# Patient Record
Sex: FEMALE | Race: OTHER | HISPANIC OR LATINO | URBAN - METROPOLITAN AREA
[De-identification: names, ages, dates, MRNs, and addresses within clinical notes are randomized per-mention and may not be internally consistent; named-entity substitution may affect disease eponyms.]

---

## 2017-03-07 ENCOUNTER — EMERGENCY (EMERGENCY)
Facility: HOSPITAL | Age: 37
LOS: 0 days | Discharge: HOME | End: 2017-03-07
Attending: EMERGENCY MEDICINE | Admitting: EMERGENCY MEDICINE
Payer: MEDICAID

## 2017-03-07 VITALS
WEIGHT: 147.05 LBS | TEMPERATURE: 98 F | RESPIRATION RATE: 18 BRPM | DIASTOLIC BLOOD PRESSURE: 76 MMHG | SYSTOLIC BLOOD PRESSURE: 122 MMHG | HEIGHT: 62 IN | HEART RATE: 70 BPM | OXYGEN SATURATION: 100 %

## 2017-03-07 DIAGNOSIS — R51 HEADACHE: ICD-10-CM

## 2017-03-07 DIAGNOSIS — R50.9 FEVER, UNSPECIFIED: ICD-10-CM

## 2017-03-07 DIAGNOSIS — H92.02 OTALGIA, LEFT EAR: ICD-10-CM

## 2017-03-07 PROCEDURE — 99284 EMERGENCY DEPT VISIT MOD MDM: CPT

## 2017-03-07 RX ORDER — IBUPROFEN 200 MG
1 TABLET ORAL
Qty: 15 | Refills: 0 | OUTPATIENT
Start: 2017-03-07 | End: 2017-03-12

## 2017-03-07 NOTE — ED STATDOCS - NS ED MD SCRIBE ATTENDING SCRIBE SECTIONS
REVIEW OF SYSTEMS/PAST MEDICAL/SURGICAL/SOCIAL HISTORY/HISTORY OF PRESENT ILLNESS/PROGRESS NOTE/RESULTS/CONSULTATIONS/SHIFT CHANGE/PHYSICAL EXAM/DISPOSITION

## 2017-03-07 NOTE — ED STATDOCS - OBJECTIVE STATEMENT
37 y/o F with no significant PMHx presents to ED c/o left ear pain, generalized HA, fever, myalgias since yesterday. Denies dysuria.  Denies other constitutional complaints at this time.  at bedside. LMP- March 1st. PMD at [facility].

## 2017-03-07 NOTE — ED STATDOCS - MEDICAL DECISION MAKING DETAILS
neuro: CN II - XII intact, EOMI, PERRL, no papilledema, 5/5 muscle strength x 4 extremities, no sensory deficits, 2+ dtr globally, negative babinski, no ataxic gait, normal CARA and FNT, normal romberg   do not suspect meningitis non toxic will tx supporitve viral illness steroid for congestion anti inflamm return to ed for intractable pain new onset motor or senosyr deficits. or any overall worsening pt agrees to plan of care

## 2017-03-07 NOTE — ED STATDOCS - ENMT, MLM
+Nasal congestion.  Mouth with normal mucosa  Throat has no vesicles, no oropharyngeal exudates and uvula is midline.

## 2017-03-25 ENCOUNTER — EMERGENCY (EMERGENCY)
Facility: HOSPITAL | Age: 37
LOS: 0 days | Discharge: HOME | End: 2017-03-25
Attending: EMERGENCY MEDICINE | Admitting: EMERGENCY MEDICINE
Payer: MEDICAID

## 2017-03-25 VITALS
HEART RATE: 67 BPM | RESPIRATION RATE: 20 BRPM | TEMPERATURE: 98 F | DIASTOLIC BLOOD PRESSURE: 73 MMHG | SYSTOLIC BLOOD PRESSURE: 119 MMHG | OXYGEN SATURATION: 100 %

## 2017-03-25 VITALS — WEIGHT: 156.09 LBS | HEIGHT: 59 IN

## 2017-03-25 DIAGNOSIS — R51 HEADACHE: ICD-10-CM

## 2017-03-25 LAB
ALBUMIN SERPL ELPH-MCNC: 4 G/DL — SIGNIFICANT CHANGE UP (ref 3.3–5)
ALP SERPL-CCNC: 78 U/L — SIGNIFICANT CHANGE UP (ref 40–120)
ALT FLD-CCNC: 22 U/L — SIGNIFICANT CHANGE UP (ref 12–78)
ANION GAP SERPL CALC-SCNC: 9 MMOL/L — SIGNIFICANT CHANGE UP (ref 5–17)
AST SERPL-CCNC: 20 U/L — SIGNIFICANT CHANGE UP (ref 15–37)
BASOPHILS # BLD AUTO: 0.1 K/UL — SIGNIFICANT CHANGE UP (ref 0–0.2)
BASOPHILS NFR BLD AUTO: 1.2 % — SIGNIFICANT CHANGE UP (ref 0–2)
BILIRUB SERPL-MCNC: 0.5 MG/DL — SIGNIFICANT CHANGE UP (ref 0.2–1.2)
BUN SERPL-MCNC: 8 MG/DL — SIGNIFICANT CHANGE UP (ref 7–23)
CALCIUM SERPL-MCNC: 8.9 MG/DL — SIGNIFICANT CHANGE UP (ref 8.5–10.1)
CHLORIDE SERPL-SCNC: 106 MMOL/L — SIGNIFICANT CHANGE UP (ref 96–108)
CO2 SERPL-SCNC: 25 MMOL/L — SIGNIFICANT CHANGE UP (ref 22–31)
CREAT SERPL-MCNC: 0.59 MG/DL — SIGNIFICANT CHANGE UP (ref 0.5–1.3)
EOSINOPHIL # BLD AUTO: 0.2 K/UL — SIGNIFICANT CHANGE UP (ref 0–0.5)
EOSINOPHIL NFR BLD AUTO: 2.2 % — SIGNIFICANT CHANGE UP (ref 0–6)
GLUCOSE SERPL-MCNC: 92 MG/DL — SIGNIFICANT CHANGE UP (ref 70–99)
HCT VFR BLD CALC: 41.9 % — SIGNIFICANT CHANGE UP (ref 34.5–45)
HGB BLD-MCNC: 14.3 G/DL — SIGNIFICANT CHANGE UP (ref 11.5–15.5)
LYMPHOCYTES # BLD AUTO: 2 K/UL — SIGNIFICANT CHANGE UP (ref 1–3.3)
LYMPHOCYTES # BLD AUTO: 26.2 % — SIGNIFICANT CHANGE UP (ref 13–44)
MCHC RBC-ENTMCNC: 30.4 PG — SIGNIFICANT CHANGE UP (ref 27–34)
MCHC RBC-ENTMCNC: 34.2 GM/DL — SIGNIFICANT CHANGE UP (ref 32–36)
MCV RBC AUTO: 88.8 FL — SIGNIFICANT CHANGE UP (ref 80–100)
MONOCYTES # BLD AUTO: 0.3 K/UL — SIGNIFICANT CHANGE UP (ref 0–0.9)
MONOCYTES NFR BLD AUTO: 3.9 % — SIGNIFICANT CHANGE UP (ref 2–14)
NEUTROPHILS # BLD AUTO: 5.1 K/UL — SIGNIFICANT CHANGE UP (ref 1.8–7.4)
NEUTROPHILS NFR BLD AUTO: 66.5 % — SIGNIFICANT CHANGE UP (ref 43–77)
PLATELET # BLD AUTO: 228 K/UL — SIGNIFICANT CHANGE UP (ref 150–400)
POTASSIUM SERPL-MCNC: 4.3 MMOL/L — SIGNIFICANT CHANGE UP (ref 3.5–5.3)
POTASSIUM SERPL-SCNC: 4.3 MMOL/L — SIGNIFICANT CHANGE UP (ref 3.5–5.3)
PROT SERPL-MCNC: 7.5 GM/DL — SIGNIFICANT CHANGE UP (ref 6–8.3)
RBC # BLD: 4.72 M/UL — SIGNIFICANT CHANGE UP (ref 3.8–5.2)
RBC # FLD: 11.5 % — SIGNIFICANT CHANGE UP (ref 10.3–14.5)
SODIUM SERPL-SCNC: 140 MMOL/L — SIGNIFICANT CHANGE UP (ref 135–145)
WBC # BLD: 7.6 K/UL — SIGNIFICANT CHANGE UP (ref 3.8–10.5)
WBC # FLD AUTO: 7.6 K/UL — SIGNIFICANT CHANGE UP (ref 3.8–10.5)

## 2017-03-25 PROCEDURE — 70450 CT HEAD/BRAIN W/O DYE: CPT | Mod: 26

## 2017-03-25 PROCEDURE — 99285 EMERGENCY DEPT VISIT HI MDM: CPT

## 2017-03-25 RX ORDER — KETOROLAC TROMETHAMINE 30 MG/ML
30 SYRINGE (ML) INJECTION ONCE
Qty: 0 | Refills: 0 | Status: DISCONTINUED | OUTPATIENT
Start: 2017-03-25 | End: 2017-03-25

## 2017-03-25 RX ORDER — SODIUM CHLORIDE 9 MG/ML
1000 INJECTION INTRAMUSCULAR; INTRAVENOUS; SUBCUTANEOUS ONCE
Qty: 0 | Refills: 0 | Status: COMPLETED | OUTPATIENT
Start: 2017-03-25 | End: 2017-03-25

## 2017-03-25 RX ORDER — METOCLOPRAMIDE HCL 10 MG
10 TABLET ORAL ONCE
Qty: 0 | Refills: 0 | Status: COMPLETED | OUTPATIENT
Start: 2017-03-25 | End: 2017-03-25

## 2017-03-25 RX ORDER — ACETAMINOPHEN 500 MG
1000 TABLET ORAL ONCE
Qty: 0 | Refills: 0 | Status: COMPLETED | OUTPATIENT
Start: 2017-03-25 | End: 2017-03-25

## 2017-03-25 RX ADMIN — Medication 10 MILLIGRAM(S): at 12:12

## 2017-03-25 RX ADMIN — Medication 30 MILLIGRAM(S): at 13:15

## 2017-03-25 RX ADMIN — SODIUM CHLORIDE 1000 MILLILITER(S): 9 INJECTION INTRAMUSCULAR; INTRAVENOUS; SUBCUTANEOUS at 12:09

## 2017-03-25 RX ADMIN — Medication 400 MILLIGRAM(S): at 12:10

## 2017-03-25 NOTE — ED STATDOCS - OBJECTIVE STATEMENT
35 y/o F presents to the ED c/o headache and nausea. Pt seen in  ED 2 weeks ago for similar symptoms. She went to [facility] and given medication without significant relief. She notes 3 days ago she had nausea and dizziness. She took tylenol today without significant relief. Currently pt has no other complaints and denies vomiting, diarrhea, and fever.

## 2017-03-25 NOTE — ED STATDOCS - PROGRESS NOTE DETAILS
36 yr. old female presents to ED with headache 36 yr. old female presents to ED with headache for 1 month + chills no fever + nausea no vomiting no visual disturbance. Reports she was seen in ED 2 weeks ago for the same. She states she F/U at [facility] and given Rx. without relief. PE; HEENT PERRLA EOMS intact, Neck; = tenderness left tonsilar node, throat eythematous no exudate, Chest; Lungs clear. Abd; soft non tender Plan: IV , saline and 1 Liter of fluids Reglan and Acetomenophed IV .Will F/U with results and re evaluate. Lucia PATTON 36 yr. old female presents to ED with headache for 1 month + chills no fever + nausea no vomiting no visual disturbance. Reports she was seen in ED 2 weeks ago for the same. She states she F/U at [facility] and given Rx. without relief. PE; HEENT PERRLA EOMS intact, Neck; = tenderness left tonsilar node, throat eythematous no exudate, Chest; Lungs clear. Abd; soft non tender Plan: IV , saline and 1 Liter of fluids Reglan and Acetomenophed IV .Will F/U with results and re evaluate. Lucia PATTON   # [id number]

## 2017-03-25 NOTE — ED STATDOCS - NS ED MD SCRIBE ATTENDING SCRIBE SECTIONS
PAST MEDICAL/SURGICAL/SOCIAL HISTORY/RESULTS/DISPOSITION/PROGRESS NOTE/HISTORY OF PRESENT ILLNESS/PHYSICAL EXAM/REVIEW OF SYSTEMS

## 2017-03-25 NOTE — ED STATDOCS - ATTENDING CONTRIBUTION TO CARE
I, Hector Fernandez, performed the initial face to face bedside interview with this patient regarding history of present illness, review of symptoms and relevant past medical, social and family history.  I completed an independent physical examination.  I was the initial provider who evaluated this patient. I have signed out the follow up of any pending tests (i.e. labs, radiological studies) to the ACP.  I have communicated the patient’s plan of care and disposition with the ACP.  The history, relevant review of systems, past medical and surgical history, medical decision making, and physical examination was documented by the scribe in my presence and I attest to the accuracy of the documentation.

## 2017-03-25 NOTE — ED ADULT NURSE NOTE - ED STAT RN HANDOFF DETAILS
Received care of patient from Super Track RN, Lorena Coelho. Patient resting comfortably will continue to monitor. Awaiting test results. Patient in no acute distress at this time.

## 2017-10-12 ENCOUNTER — EMERGENCY (EMERGENCY)
Facility: HOSPITAL | Age: 37
LOS: 0 days | Discharge: HOME | End: 2017-10-12
Attending: EMERGENCY MEDICINE | Admitting: EMERGENCY MEDICINE
Payer: MEDICAID

## 2017-10-12 VITALS — WEIGHT: 130.07 LBS | HEIGHT: 60 IN

## 2017-10-12 VITALS
HEART RATE: 68 BPM | OXYGEN SATURATION: 100 % | SYSTOLIC BLOOD PRESSURE: 113 MMHG | DIASTOLIC BLOOD PRESSURE: 75 MMHG | RESPIRATION RATE: 15 BRPM | TEMPERATURE: 98 F

## 2017-10-12 DIAGNOSIS — M54.6 PAIN IN THORACIC SPINE: ICD-10-CM

## 2017-10-12 DIAGNOSIS — R10.9 UNSPECIFIED ABDOMINAL PAIN: ICD-10-CM

## 2017-10-12 DIAGNOSIS — E03.9 HYPOTHYROIDISM, UNSPECIFIED: ICD-10-CM

## 2017-10-12 DIAGNOSIS — R31.9 HEMATURIA, UNSPECIFIED: ICD-10-CM

## 2017-10-12 LAB
ALBUMIN SERPL ELPH-MCNC: 4 G/DL — SIGNIFICANT CHANGE UP (ref 3.3–5)
ALP SERPL-CCNC: 72 U/L — SIGNIFICANT CHANGE UP (ref 40–120)
ALT FLD-CCNC: 20 U/L — SIGNIFICANT CHANGE UP (ref 12–78)
ANION GAP SERPL CALC-SCNC: 6 MMOL/L — SIGNIFICANT CHANGE UP (ref 5–17)
APPEARANCE UR: CLEAR — SIGNIFICANT CHANGE UP
AST SERPL-CCNC: 12 U/L — LOW (ref 15–37)
BACTERIA # UR AUTO: (no result)
BASOPHILS # BLD AUTO: 0.1 K/UL — SIGNIFICANT CHANGE UP (ref 0–0.2)
BASOPHILS NFR BLD AUTO: 1 % — SIGNIFICANT CHANGE UP (ref 0–2)
BILIRUB SERPL-MCNC: 0.5 MG/DL — SIGNIFICANT CHANGE UP (ref 0.2–1.2)
BILIRUB UR-MCNC: NEGATIVE — SIGNIFICANT CHANGE UP
BUN SERPL-MCNC: 11 MG/DL — SIGNIFICANT CHANGE UP (ref 7–23)
CALCIUM SERPL-MCNC: 8.6 MG/DL — SIGNIFICANT CHANGE UP (ref 8.5–10.1)
CHLORIDE SERPL-SCNC: 107 MMOL/L — SIGNIFICANT CHANGE UP (ref 96–108)
CO2 SERPL-SCNC: 26 MMOL/L — SIGNIFICANT CHANGE UP (ref 22–31)
COLOR SPEC: YELLOW — SIGNIFICANT CHANGE UP
COMMENT - URINE: SIGNIFICANT CHANGE UP
CREAT SERPL-MCNC: 0.67 MG/DL — SIGNIFICANT CHANGE UP (ref 0.5–1.3)
DIFF PNL FLD: (no result)
EOSINOPHIL # BLD AUTO: 0.2 K/UL — SIGNIFICANT CHANGE UP (ref 0–0.5)
EOSINOPHIL NFR BLD AUTO: 2.8 % — SIGNIFICANT CHANGE UP (ref 0–6)
EPI CELLS # UR: SIGNIFICANT CHANGE UP
GLUCOSE SERPL-MCNC: 79 MG/DL — SIGNIFICANT CHANGE UP (ref 70–99)
GLUCOSE UR QL: NEGATIVE MG/DL — SIGNIFICANT CHANGE UP
HCT VFR BLD CALC: 41.2 % — SIGNIFICANT CHANGE UP (ref 34.5–45)
HGB BLD-MCNC: 14 G/DL — SIGNIFICANT CHANGE UP (ref 11.5–15.5)
KETONES UR-MCNC: NEGATIVE — SIGNIFICANT CHANGE UP
LEUKOCYTE ESTERASE UR-ACNC: (no result)
LIDOCAIN IGE QN: 103 U/L — SIGNIFICANT CHANGE UP (ref 73–393)
LYMPHOCYTES # BLD AUTO: 2.5 K/UL — SIGNIFICANT CHANGE UP (ref 1–3.3)
LYMPHOCYTES # BLD AUTO: 35.1 % — SIGNIFICANT CHANGE UP (ref 13–44)
MCHC RBC-ENTMCNC: 30.7 PG — SIGNIFICANT CHANGE UP (ref 27–34)
MCHC RBC-ENTMCNC: 34 GM/DL — SIGNIFICANT CHANGE UP (ref 32–36)
MCV RBC AUTO: 90.4 FL — SIGNIFICANT CHANGE UP (ref 80–100)
MONOCYTES # BLD AUTO: 0.3 K/UL — SIGNIFICANT CHANGE UP (ref 0–0.9)
MONOCYTES NFR BLD AUTO: 4.1 % — SIGNIFICANT CHANGE UP (ref 2–14)
NEUTROPHILS # BLD AUTO: 4 K/UL — SIGNIFICANT CHANGE UP (ref 1.8–7.4)
NEUTROPHILS NFR BLD AUTO: 57.1 % — SIGNIFICANT CHANGE UP (ref 43–77)
NITRITE UR-MCNC: NEGATIVE — SIGNIFICANT CHANGE UP
PH UR: 5 — SIGNIFICANT CHANGE UP (ref 5–8)
PLATELET # BLD AUTO: 231 K/UL — SIGNIFICANT CHANGE UP (ref 150–400)
POTASSIUM SERPL-MCNC: 4.1 MMOL/L — SIGNIFICANT CHANGE UP (ref 3.5–5.3)
POTASSIUM SERPL-SCNC: 4.1 MMOL/L — SIGNIFICANT CHANGE UP (ref 3.5–5.3)
PROT SERPL-MCNC: 7.1 GM/DL — SIGNIFICANT CHANGE UP (ref 6–8.3)
PROT UR-MCNC: 15 MG/DL
RBC # BLD: 4.55 M/UL — SIGNIFICANT CHANGE UP (ref 3.8–5.2)
RBC # FLD: 11 % — SIGNIFICANT CHANGE UP (ref 10.3–14.5)
RBC CASTS # UR COMP ASSIST: SIGNIFICANT CHANGE UP /HPF (ref 0–4)
SODIUM SERPL-SCNC: 139 MMOL/L — SIGNIFICANT CHANGE UP (ref 135–145)
SP GR SPEC: 1.02 — SIGNIFICANT CHANGE UP (ref 1.01–1.02)
UROBILINOGEN FLD QL: NEGATIVE MG/DL — SIGNIFICANT CHANGE UP
WBC # BLD: 7 K/UL — SIGNIFICANT CHANGE UP (ref 3.8–10.5)
WBC # FLD AUTO: 7 K/UL — SIGNIFICANT CHANGE UP (ref 3.8–10.5)
WBC UR QL: SIGNIFICANT CHANGE UP

## 2017-10-12 PROCEDURE — 99284 EMERGENCY DEPT VISIT MOD MDM: CPT

## 2017-10-12 PROCEDURE — 71020: CPT | Mod: 26

## 2017-10-12 PROCEDURE — 93010 ELECTROCARDIOGRAM REPORT: CPT

## 2017-10-12 RX ORDER — LIDOCAINE 4 G/100G
1 CREAM TOPICAL ONCE
Qty: 0 | Refills: 0 | Status: COMPLETED | OUTPATIENT
Start: 2017-10-12 | End: 2017-10-12

## 2017-10-12 RX ORDER — OXYCODONE AND ACETAMINOPHEN 5; 325 MG/1; MG/1
1 TABLET ORAL ONCE
Qty: 0 | Refills: 0 | Status: DISCONTINUED | OUTPATIENT
Start: 2017-10-12 | End: 2017-10-12

## 2017-10-12 RX ADMIN — LIDOCAINE 1 PATCH: 4 CREAM TOPICAL at 11:38

## 2017-10-12 RX ADMIN — OXYCODONE AND ACETAMINOPHEN 1 TABLET(S): 5; 325 TABLET ORAL at 11:38

## 2017-10-12 NOTE — ED PROVIDER NOTE - MEDICAL DECISION MAKING DETAILS
Pt w/ thoracic back pain.  Area is point tender directly over the muscle suggesting MSK cause but given location will get screening labs and UA.

## 2017-10-12 NOTE — ED PROVIDER NOTE - MUSCULOSKELETAL, MLM
(+) right-sided thoracic back tenderness, reproducible with palpation.  Spine appears normal, range of motion is not limited, no joint tenderness

## 2017-10-12 NOTE — ED PROVIDER NOTE - PROGRESS NOTE DETAILS
UA some blood, not suggesting of UTI reymundo w/ lac of urinary symptoms. No acute severe colicky pain as seen w/ stones.  Motrin PRN, recommend f/u and further eval at [facility]/PMD.

## 2017-10-12 NOTE — ED ADULT NURSE NOTE - CHPI ED SYMPTOMS NEG
no anorexia/no bladder dysfunction/no numbness/no bowel dysfunction/no motor function loss/no tingling/no difficulty bearing weight

## 2017-10-12 NOTE — ED PROVIDER NOTE - NS_ ATTENDINGSCRIBEDETAILS _ED_A_ED_FT
I, Hector Serrano, performed the initial face to face bedside interview with this patient regarding history of present illness, review of symptoms and relevant past medical, social and family history.  I completed an independent physical examination.  I was the initial provider who evaluated this patient.  The history, relevant review of systems, past medical and surgical history, medical decision making, and physical examination was documented by the scribe in my presence and I attest to the accuracy of the documentation.

## 2017-10-12 NOTE — ED PROVIDER NOTE - OBJECTIVE STATEMENT
36 y/o female with PMHx of cholecystectomy, hypothyroidism presents to the ED c/o pain in center of her back for 1 week, worse today.  Pain is worse with movement. (+) SOB due to pain.  Denies cough, denies bowel or bladder dysfunction.  Took ibuprofen without relief.  Nonsmoker.

## 2017-12-27 ENCOUNTER — EMERGENCY (EMERGENCY)
Facility: HOSPITAL | Age: 37
LOS: 0 days | Discharge: HOME | End: 2017-12-28
Attending: EMERGENCY MEDICINE | Admitting: EMERGENCY MEDICINE
Payer: MEDICAID

## 2017-12-27 VITALS — WEIGHT: 130.07 LBS

## 2017-12-27 DIAGNOSIS — R10.32 LEFT LOWER QUADRANT PAIN: ICD-10-CM

## 2017-12-27 LAB
ALBUMIN SERPL ELPH-MCNC: 4 G/DL — SIGNIFICANT CHANGE UP (ref 3.3–5)
ALP SERPL-CCNC: 89 U/L — SIGNIFICANT CHANGE UP (ref 40–120)
ALT FLD-CCNC: 28 U/L — SIGNIFICANT CHANGE UP (ref 12–78)
ANION GAP SERPL CALC-SCNC: 5 MMOL/L — SIGNIFICANT CHANGE UP (ref 5–17)
APPEARANCE UR: CLEAR — SIGNIFICANT CHANGE UP
AST SERPL-CCNC: 23 U/L — SIGNIFICANT CHANGE UP (ref 15–37)
BACTERIA # UR AUTO: NEGATIVE — SIGNIFICANT CHANGE UP
BASOPHILS # BLD AUTO: 0.1 K/UL — SIGNIFICANT CHANGE UP (ref 0–0.2)
BASOPHILS NFR BLD AUTO: 1.5 % — SIGNIFICANT CHANGE UP (ref 0–2)
BILIRUB SERPL-MCNC: 0.2 MG/DL — SIGNIFICANT CHANGE UP (ref 0.2–1.2)
BILIRUB UR-MCNC: NEGATIVE — SIGNIFICANT CHANGE UP
BUN SERPL-MCNC: 14 MG/DL — SIGNIFICANT CHANGE UP (ref 7–23)
CALCIUM SERPL-MCNC: 8.8 MG/DL — SIGNIFICANT CHANGE UP (ref 8.5–10.1)
CHLORIDE SERPL-SCNC: 105 MMOL/L — SIGNIFICANT CHANGE UP (ref 96–108)
CO2 SERPL-SCNC: 26 MMOL/L — SIGNIFICANT CHANGE UP (ref 22–31)
COLOR SPEC: YELLOW — SIGNIFICANT CHANGE UP
CREAT SERPL-MCNC: 0.63 MG/DL — SIGNIFICANT CHANGE UP (ref 0.5–1.3)
DIFF PNL FLD: (no result)
EOSINOPHIL # BLD AUTO: 0.1 K/UL — SIGNIFICANT CHANGE UP (ref 0–0.5)
EOSINOPHIL NFR BLD AUTO: 2 % — SIGNIFICANT CHANGE UP (ref 0–6)
EPI CELLS # UR: SIGNIFICANT CHANGE UP
GLUCOSE SERPL-MCNC: 87 MG/DL — SIGNIFICANT CHANGE UP (ref 70–99)
GLUCOSE UR QL: NEGATIVE MG/DL — SIGNIFICANT CHANGE UP
HCG SERPL-ACNC: <1 MIU/ML — SIGNIFICANT CHANGE UP
HCT VFR BLD CALC: 42.1 % — SIGNIFICANT CHANGE UP (ref 34.5–45)
HGB BLD-MCNC: 14.2 G/DL — SIGNIFICANT CHANGE UP (ref 11.5–15.5)
KETONES UR-MCNC: NEGATIVE — SIGNIFICANT CHANGE UP
LEUKOCYTE ESTERASE UR-ACNC: NEGATIVE — SIGNIFICANT CHANGE UP
LYMPHOCYTES # BLD AUTO: 2.9 K/UL — SIGNIFICANT CHANGE UP (ref 1–3.3)
LYMPHOCYTES # BLD AUTO: 37.9 % — SIGNIFICANT CHANGE UP (ref 13–44)
MCHC RBC-ENTMCNC: 30.2 PG — SIGNIFICANT CHANGE UP (ref 27–34)
MCHC RBC-ENTMCNC: 33.6 GM/DL — SIGNIFICANT CHANGE UP (ref 32–36)
MCV RBC AUTO: 90 FL — SIGNIFICANT CHANGE UP (ref 80–100)
MONOCYTES # BLD AUTO: 0.5 K/UL — SIGNIFICANT CHANGE UP (ref 0–0.9)
MONOCYTES NFR BLD AUTO: 6.2 % — SIGNIFICANT CHANGE UP (ref 2–14)
NEUTROPHILS # BLD AUTO: 4 K/UL — SIGNIFICANT CHANGE UP (ref 1.8–7.4)
NEUTROPHILS NFR BLD AUTO: 52.5 % — SIGNIFICANT CHANGE UP (ref 43–77)
NITRITE UR-MCNC: NEGATIVE — SIGNIFICANT CHANGE UP
PH UR: 6.5 — SIGNIFICANT CHANGE UP (ref 5–8)
PLATELET # BLD AUTO: 239 K/UL — SIGNIFICANT CHANGE UP (ref 150–400)
POTASSIUM SERPL-MCNC: 4.1 MMOL/L — SIGNIFICANT CHANGE UP (ref 3.5–5.3)
POTASSIUM SERPL-SCNC: 4.1 MMOL/L — SIGNIFICANT CHANGE UP (ref 3.5–5.3)
PROT SERPL-MCNC: 7.6 GM/DL — SIGNIFICANT CHANGE UP (ref 6–8.3)
PROT UR-MCNC: NEGATIVE MG/DL — SIGNIFICANT CHANGE UP
RBC # BLD: 4.68 M/UL — SIGNIFICANT CHANGE UP (ref 3.8–5.2)
RBC # FLD: 11.7 % — SIGNIFICANT CHANGE UP (ref 10.3–14.5)
RBC CASTS # UR COMP ASSIST: (no result) /HPF (ref 0–4)
SODIUM SERPL-SCNC: 136 MMOL/L — SIGNIFICANT CHANGE UP (ref 135–145)
SP GR SPEC: 1 — LOW (ref 1.01–1.02)
UROBILINOGEN FLD QL: NEGATIVE MG/DL — SIGNIFICANT CHANGE UP
WBC # BLD: 7.6 K/UL — SIGNIFICANT CHANGE UP (ref 3.8–10.5)
WBC # FLD AUTO: 7.6 K/UL — SIGNIFICANT CHANGE UP (ref 3.8–10.5)
WBC UR QL: SIGNIFICANT CHANGE UP

## 2017-12-27 PROCEDURE — 74177 CT ABD & PELVIS W/CONTRAST: CPT | Mod: 26

## 2017-12-27 PROCEDURE — 99284 EMERGENCY DEPT VISIT MOD MDM: CPT

## 2017-12-27 RX ORDER — SODIUM CHLORIDE 9 MG/ML
3 INJECTION INTRAMUSCULAR; INTRAVENOUS; SUBCUTANEOUS ONCE
Qty: 0 | Refills: 0 | Status: COMPLETED | OUTPATIENT
Start: 2017-12-27 | End: 2017-12-27

## 2017-12-27 RX ORDER — SODIUM CHLORIDE 9 MG/ML
1000 INJECTION INTRAMUSCULAR; INTRAVENOUS; SUBCUTANEOUS ONCE
Qty: 0 | Refills: 0 | Status: COMPLETED | OUTPATIENT
Start: 2017-12-27 | End: 2017-12-27

## 2017-12-27 RX ADMIN — SODIUM CHLORIDE 3 MILLILITER(S): 9 INJECTION INTRAMUSCULAR; INTRAVENOUS; SUBCUTANEOUS at 21:46

## 2017-12-27 RX ADMIN — SODIUM CHLORIDE 1000 MILLILITER(S): 9 INJECTION INTRAMUSCULAR; INTRAVENOUS; SUBCUTANEOUS at 21:46

## 2017-12-27 NOTE — ED STATDOCS - OBJECTIVE STATEMENT
36 y/o female with PMHx of hypothyroidism presents to the ED c/o LLQ abdominal pain for 2 days, worse in LLQ.  No fever, no diarrhea, no vomiting.  no other acute medical complaints.  NKDA.  PCP Dr Santiago at [facility]

## 2017-12-27 NOTE — ED STATDOCS - ATTENDING CONTRIBUTION TO CARE
Attending Contribution to Care: I, Nataliya Robertson, performed the initial face to face bedside interview with this patient regarding history of present illness, review of symptoms and relevant past medical, social and family history.  I completed an independent physical examination.  I was the initial provider who evaluated this patient and the history, physical, and MDM reflect this intial assessment. I have signed out the follow up of any pending tests after the original (i.e. labs, radiological studies) to the ACP with instructions to review any with instructions to review any concerning findings to me prior to discharge.  I have communicated the patient’s plan of care and disposition with the ACP.

## 2017-12-27 NOTE — ED ADULT NURSE NOTE - OBJECTIVE STATEMENT
Pt c/o LLQ abdominal pain that started Saturday that radiates down leg and into knee, described as throbbing and burning 10/10. Has hx cholecystectomy x4 years ago and "some of my veins removed last year and I was told I'm not allowed to make much effort because it is dangerous so I don't lift anything heavy or work". Pt also reports pain sometimes in back, but does not recall any injury or lifting anything heavy. +nausea, denies diarrhea or vomiting, reports having problems with BMs but last BM 12/26/2017 in evening. Denies urinary F/U/D. Denies fevers/chills. Pt able to tolerate PO well.

## 2017-12-28 VITALS
RESPIRATION RATE: 16 BRPM | OXYGEN SATURATION: 100 % | TEMPERATURE: 98 F | SYSTOLIC BLOOD PRESSURE: 120 MMHG | DIASTOLIC BLOOD PRESSURE: 76 MMHG | HEART RATE: 69 BPM

## 2017-12-28 RX ORDER — KETOROLAC TROMETHAMINE 30 MG/ML
30 SYRINGE (ML) INJECTION ONCE
Qty: 0 | Refills: 0 | Status: DISCONTINUED | OUTPATIENT
Start: 2017-12-28 | End: 2017-12-28

## 2017-12-28 RX ADMIN — Medication 30 MILLIGRAM(S): at 01:19

## 2018-03-15 ENCOUNTER — APPOINTMENT (OUTPATIENT)
Dept: CT IMAGING | Facility: CLINIC | Age: 38
End: 2018-03-15

## 2018-03-20 ENCOUNTER — OUTPATIENT (OUTPATIENT)
Dept: OUTPATIENT SERVICES | Facility: HOSPITAL | Age: 38
LOS: 1 days | End: 2018-03-20
Payer: COMMERCIAL

## 2018-03-20 ENCOUNTER — APPOINTMENT (OUTPATIENT)
Dept: MRI IMAGING | Facility: CLINIC | Age: 38
End: 2018-03-20
Payer: COMMERCIAL

## 2018-03-20 DIAGNOSIS — Z00.8 ENCOUNTER FOR OTHER GENERAL EXAMINATION: ICD-10-CM

## 2018-03-20 PROCEDURE — 70551 MRI BRAIN STEM W/O DYE: CPT | Mod: 26

## 2018-03-20 PROCEDURE — 70551 MRI BRAIN STEM W/O DYE: CPT

## 2018-08-24 ENCOUNTER — RESULT REVIEW (OUTPATIENT)
Age: 38
End: 2018-08-24

## 2018-08-24 PROBLEM — E03.9 HYPOTHYROIDISM, UNSPECIFIED: Chronic | Status: ACTIVE | Noted: 2017-10-20

## 2018-08-27 ENCOUNTER — APPOINTMENT (OUTPATIENT)
Dept: MAMMOGRAPHY | Facility: CLINIC | Age: 38
End: 2018-08-27
Payer: COMMERCIAL

## 2018-08-27 ENCOUNTER — OUTPATIENT (OUTPATIENT)
Dept: OUTPATIENT SERVICES | Facility: HOSPITAL | Age: 38
LOS: 1 days | End: 2018-08-27
Payer: COMMERCIAL

## 2018-08-27 DIAGNOSIS — Z00.8 ENCOUNTER FOR OTHER GENERAL EXAMINATION: ICD-10-CM

## 2018-08-27 PROCEDURE — 77063 BREAST TOMOSYNTHESIS BI: CPT

## 2018-08-27 PROCEDURE — 77067 SCR MAMMO BI INCL CAD: CPT | Mod: 26

## 2018-08-27 PROCEDURE — 77067 SCR MAMMO BI INCL CAD: CPT

## 2018-08-27 PROCEDURE — 77063 BREAST TOMOSYNTHESIS BI: CPT | Mod: 26

## 2018-09-16 ENCOUNTER — EMERGENCY (EMERGENCY)
Facility: HOSPITAL | Age: 38
LOS: 0 days | Discharge: HOME | End: 2018-09-16
Attending: STUDENT IN AN ORGANIZED HEALTH CARE EDUCATION/TRAINING PROGRAM | Admitting: STUDENT IN AN ORGANIZED HEALTH CARE EDUCATION/TRAINING PROGRAM
Payer: MEDICAID

## 2018-09-16 VITALS — HEART RATE: 65 BPM | DIASTOLIC BLOOD PRESSURE: 78 MMHG | TEMPERATURE: 97 F | SYSTOLIC BLOOD PRESSURE: 113 MMHG

## 2018-09-16 VITALS — HEIGHT: 60 IN | WEIGHT: 149.91 LBS

## 2018-09-16 DIAGNOSIS — E03.9 HYPOTHYROIDISM, UNSPECIFIED: ICD-10-CM

## 2018-09-16 DIAGNOSIS — R11.10 VOMITING, UNSPECIFIED: ICD-10-CM

## 2018-09-16 DIAGNOSIS — R42 DIZZINESS AND GIDDINESS: ICD-10-CM

## 2018-09-16 DIAGNOSIS — Z90.49 ACQUIRED ABSENCE OF OTHER SPECIFIED PARTS OF DIGESTIVE TRACT: ICD-10-CM

## 2018-09-16 LAB
ALBUMIN SERPL ELPH-MCNC: 4 G/DL — SIGNIFICANT CHANGE UP (ref 3.3–5)
ALP SERPL-CCNC: 75 U/L — SIGNIFICANT CHANGE UP (ref 40–120)
ALT FLD-CCNC: 19 U/L — SIGNIFICANT CHANGE UP (ref 12–78)
ANION GAP SERPL CALC-SCNC: 6 MMOL/L — SIGNIFICANT CHANGE UP (ref 5–17)
APPEARANCE UR: CLEAR — SIGNIFICANT CHANGE UP
AST SERPL-CCNC: 11 U/L — LOW (ref 15–37)
BACTERIA # UR AUTO: ABNORMAL
BASOPHILS # BLD AUTO: 0.06 K/UL — SIGNIFICANT CHANGE UP (ref 0–0.2)
BASOPHILS NFR BLD AUTO: 0.7 % — SIGNIFICANT CHANGE UP (ref 0–2)
BILIRUB SERPL-MCNC: 0.5 MG/DL — SIGNIFICANT CHANGE UP (ref 0.2–1.2)
BILIRUB UR-MCNC: NEGATIVE — SIGNIFICANT CHANGE UP
BUN SERPL-MCNC: 15 MG/DL — SIGNIFICANT CHANGE UP (ref 7–23)
CALCIUM SERPL-MCNC: 8.4 MG/DL — LOW (ref 8.5–10.1)
CHLORIDE SERPL-SCNC: 107 MMOL/L — SIGNIFICANT CHANGE UP (ref 96–108)
CO2 SERPL-SCNC: 27 MMOL/L — SIGNIFICANT CHANGE UP (ref 22–31)
COLOR SPEC: YELLOW — SIGNIFICANT CHANGE UP
COMMENT - URINE: SIGNIFICANT CHANGE UP
CREAT SERPL-MCNC: 0.66 MG/DL — SIGNIFICANT CHANGE UP (ref 0.5–1.3)
DIFF PNL FLD: ABNORMAL
EOSINOPHIL # BLD AUTO: 0.18 K/UL — SIGNIFICANT CHANGE UP (ref 0–0.5)
EOSINOPHIL NFR BLD AUTO: 2.1 % — SIGNIFICANT CHANGE UP (ref 0–6)
EPI CELLS # UR: SIGNIFICANT CHANGE UP
GLUCOSE SERPL-MCNC: 97 MG/DL — SIGNIFICANT CHANGE UP (ref 70–99)
GLUCOSE UR QL: NEGATIVE MG/DL — SIGNIFICANT CHANGE UP
HCT VFR BLD CALC: 39.2 % — SIGNIFICANT CHANGE UP (ref 34.5–45)
HGB BLD-MCNC: 13.3 G/DL — SIGNIFICANT CHANGE UP (ref 11.5–15.5)
IMM GRANULOCYTES NFR BLD AUTO: 0.4 % — SIGNIFICANT CHANGE UP (ref 0–1.5)
KETONES UR-MCNC: NEGATIVE — SIGNIFICANT CHANGE UP
LEUKOCYTE ESTERASE UR-ACNC: NEGATIVE — SIGNIFICANT CHANGE UP
LIDOCAIN IGE QN: 63 U/L — LOW (ref 73–393)
LYMPHOCYTES # BLD AUTO: 2.05 K/UL — SIGNIFICANT CHANGE UP (ref 1–3.3)
LYMPHOCYTES # BLD AUTO: 24.1 % — SIGNIFICANT CHANGE UP (ref 13–44)
MCHC RBC-ENTMCNC: 30.4 PG — SIGNIFICANT CHANGE UP (ref 27–34)
MCHC RBC-ENTMCNC: 33.9 GM/DL — SIGNIFICANT CHANGE UP (ref 32–36)
MCV RBC AUTO: 89.5 FL — SIGNIFICANT CHANGE UP (ref 80–100)
MONOCYTES # BLD AUTO: 0.37 K/UL — SIGNIFICANT CHANGE UP (ref 0–0.9)
MONOCYTES NFR BLD AUTO: 4.4 % — SIGNIFICANT CHANGE UP (ref 2–14)
NEUTROPHILS # BLD AUTO: 5.8 K/UL — SIGNIFICANT CHANGE UP (ref 1.8–7.4)
NEUTROPHILS NFR BLD AUTO: 68.3 % — SIGNIFICANT CHANGE UP (ref 43–77)
NITRITE UR-MCNC: NEGATIVE — SIGNIFICANT CHANGE UP
NRBC # BLD: 0 /100 WBCS — SIGNIFICANT CHANGE UP (ref 0–0)
PH UR: 5 — SIGNIFICANT CHANGE UP (ref 5–8)
PLATELET # BLD AUTO: 238 K/UL — SIGNIFICANT CHANGE UP (ref 150–400)
POTASSIUM SERPL-MCNC: 3.8 MMOL/L — SIGNIFICANT CHANGE UP (ref 3.5–5.3)
POTASSIUM SERPL-SCNC: 3.8 MMOL/L — SIGNIFICANT CHANGE UP (ref 3.5–5.3)
PROT SERPL-MCNC: 7.3 GM/DL — SIGNIFICANT CHANGE UP (ref 6–8.3)
PROT UR-MCNC: 15 MG/DL
RBC # BLD: 4.38 M/UL — SIGNIFICANT CHANGE UP (ref 3.8–5.2)
RBC # FLD: 12.1 % — SIGNIFICANT CHANGE UP (ref 10.3–14.5)
RBC CASTS # UR COMP ASSIST: ABNORMAL /HPF (ref 0–4)
SODIUM SERPL-SCNC: 140 MMOL/L — SIGNIFICANT CHANGE UP (ref 135–145)
SP GR SPEC: 1.02 — SIGNIFICANT CHANGE UP (ref 1.01–1.02)
UROBILINOGEN FLD QL: NEGATIVE MG/DL — SIGNIFICANT CHANGE UP
WBC # BLD: 8.49 K/UL — SIGNIFICANT CHANGE UP (ref 3.8–10.5)
WBC # FLD AUTO: 8.49 K/UL — SIGNIFICANT CHANGE UP (ref 3.8–10.5)
WBC UR QL: NEGATIVE — SIGNIFICANT CHANGE UP

## 2018-09-16 PROCEDURE — 93010 ELECTROCARDIOGRAM REPORT: CPT

## 2018-09-16 PROCEDURE — 99285 EMERGENCY DEPT VISIT HI MDM: CPT

## 2018-09-16 RX ORDER — MECLIZINE HCL 12.5 MG
12.5 TABLET ORAL ONCE
Qty: 0 | Refills: 0 | Status: DISCONTINUED | OUTPATIENT
Start: 2018-09-16 | End: 2018-09-16

## 2018-09-16 RX ORDER — FAMOTIDINE 10 MG/ML
20 INJECTION INTRAVENOUS ONCE
Qty: 0 | Refills: 0 | Status: COMPLETED | OUTPATIENT
Start: 2018-09-16 | End: 2018-09-16

## 2018-09-16 RX ORDER — MECLIZINE HCL 12.5 MG
1 TABLET ORAL
Qty: 15 | Refills: 0 | OUTPATIENT
Start: 2018-09-16 | End: 2018-09-20

## 2018-09-16 RX ORDER — ONDANSETRON 8 MG/1
4 TABLET, FILM COATED ORAL ONCE
Qty: 0 | Refills: 0 | Status: COMPLETED | OUTPATIENT
Start: 2018-09-16 | End: 2018-09-16

## 2018-09-16 RX ORDER — SODIUM CHLORIDE 9 MG/ML
1000 INJECTION INTRAMUSCULAR; INTRAVENOUS; SUBCUTANEOUS ONCE
Qty: 0 | Refills: 0 | Status: COMPLETED | OUTPATIENT
Start: 2018-09-16 | End: 2018-09-16

## 2018-09-16 RX ADMIN — SODIUM CHLORIDE 1000 MILLILITER(S): 9 INJECTION INTRAMUSCULAR; INTRAVENOUS; SUBCUTANEOUS at 11:42

## 2018-09-16 RX ADMIN — SODIUM CHLORIDE 1000 MILLILITER(S): 9 INJECTION INTRAMUSCULAR; INTRAVENOUS; SUBCUTANEOUS at 12:42

## 2018-09-16 RX ADMIN — FAMOTIDINE 20 MILLIGRAM(S): 10 INJECTION INTRAVENOUS at 11:42

## 2018-09-16 RX ADMIN — ONDANSETRON 4 MILLIGRAM(S): 8 TABLET, FILM COATED ORAL at 12:35

## 2018-09-16 NOTE — ED STATDOCS - ATTENDING CONTRIBUTION TO CARE
I, Kalee Barajas DO,  performed the initial face to face bedside interview with this patient regarding history of present illness, review of symptoms and relevant past medical, social and family history.  I completed an independent physical examination.  I was the initial provider who evaluated this patient. I have signed out the follow up of any pending tests (i.e. labs, radiological studies) to the ACP.  I have communicated the patient’s plan of care and disposition with the ACP.  The history, relevant review of systems, past medical and surgical history, medical decision making, and physical examination was documented by the scribe in my presence and I attest to the accuracy of the documentation.

## 2018-09-16 NOTE — ED STATDOCS - OBJECTIVE STATEMENT
39 y/o female with a PMHx of hypothyroidism, cholecystectomy presents to the ED c/o vomiting and diffuse abd pain, starting this morning around 5am. +bilateral lower back pain. Pt reports the abd pain began first and then later had 3 episodes of emesis, notes it to be a yellow color. Denies hematemesis. Pt states she experiences lightheadedness and dizziness associated with abd pain and vomiting, says the "room is spinning". Denies chest pain, urinary complaints, diarrhea. Pt has experienced dizziness in the past 2 years ago and came to the ED, diagnosed with migraines. LMP 8 days ago. No recent travel. No new medications or new foods. No ETOH, tobacco, or drug use. FHMx of mother with DM. No pain meds PTA.  #[id number].

## 2018-09-16 NOTE — ED STATDOCS - PROGRESS NOTE DETAILS
ESTRELLA Yuan:   Patient has been seen, evaluated and orders have been written by the attending in intake. Patient is stable.  I will follow up the results of orders written and I will continue to evaluate/observe the patient.  Margarette Yuan PA-C Pt. feeling better will continue to monitor.  Margarette Yuan PA-C Pt. states pain and dizziness improved.  Via interpretor #[id number] reviewed labs and discussed need for follow up.  Will send patient home with meclizine.  Pt. understands need for follow up.  Discussed return precautions.  Margarette Yuan PA-C

## 2018-09-16 NOTE — ED STATDOCS - MEDICAL DECISION MAKING DETAILS
39 y/o female with vomiting, HA and abd pain, benign exam, plan for labs. 39 y/o female with vomiting, HA and abd pain, benign exam, plan for labs, urine; re-eval

## 2018-09-17 LAB
CULTURE RESULTS: SIGNIFICANT CHANGE UP
SPECIMEN SOURCE: SIGNIFICANT CHANGE UP

## 2018-10-03 ENCOUNTER — RESULT REVIEW (OUTPATIENT)
Age: 38
End: 2018-10-03

## 2018-10-03 ENCOUNTER — OUTPATIENT (OUTPATIENT)
Dept: OUTPATIENT SERVICES | Facility: HOSPITAL | Age: 38
LOS: 1 days | Discharge: HOME | End: 2018-10-03
Payer: MEDICAID

## 2018-10-03 VITALS
DIASTOLIC BLOOD PRESSURE: 71 MMHG | SYSTOLIC BLOOD PRESSURE: 104 MMHG | WEIGHT: 139.99 LBS | TEMPERATURE: 98 F | HEIGHT: 59 IN | HEART RATE: 73 BPM | RESPIRATION RATE: 17 BRPM | OXYGEN SATURATION: 100 %

## 2018-10-03 LAB — HCG UR QL: NEGATIVE — SIGNIFICANT CHANGE UP

## 2018-10-03 PROCEDURE — 88305 TISSUE EXAM BY PATHOLOGIST: CPT | Mod: 26

## 2018-10-03 PROCEDURE — 88312 SPECIAL STAINS GROUP 1: CPT | Mod: 26

## 2018-10-03 PROCEDURE — 88313 SPECIAL STAINS GROUP 2: CPT | Mod: 26

## 2018-10-03 RX ORDER — LEVOTHYROXINE SODIUM 125 MCG
0 TABLET ORAL
Qty: 0 | Refills: 0 | COMMUNITY

## 2018-10-05 LAB — SURGICAL PATHOLOGY FINAL REPORT - CH: SIGNIFICANT CHANGE UP

## 2018-10-08 DIAGNOSIS — E03.9 HYPOTHYROIDISM, UNSPECIFIED: ICD-10-CM

## 2018-10-08 DIAGNOSIS — K21.9 GASTRO-ESOPHAGEAL REFLUX DISEASE WITHOUT ESOPHAGITIS: ICD-10-CM

## 2018-10-08 DIAGNOSIS — K29.50 UNSPECIFIED CHRONIC GASTRITIS WITHOUT BLEEDING: ICD-10-CM

## 2018-10-08 DIAGNOSIS — B96.81 HELICOBACTER PYLORI [H. PYLORI] AS THE CAUSE OF DISEASES CLASSIFIED ELSEWHERE: ICD-10-CM

## 2018-11-29 ENCOUNTER — APPOINTMENT (OUTPATIENT)
Dept: CT IMAGING | Facility: CLINIC | Age: 38
End: 2018-11-29

## 2018-11-29 ENCOUNTER — OUTPATIENT (OUTPATIENT)
Dept: OUTPATIENT SERVICES | Facility: HOSPITAL | Age: 38
LOS: 1 days | End: 2018-11-29
Payer: COMMERCIAL

## 2018-11-29 DIAGNOSIS — Z00.8 ENCOUNTER FOR OTHER GENERAL EXAMINATION: ICD-10-CM

## 2018-11-29 PROCEDURE — 74177 CT ABD & PELVIS W/CONTRAST: CPT | Mod: 26

## 2018-11-29 PROCEDURE — 74177 CT ABD & PELVIS W/CONTRAST: CPT

## 2019-03-23 ENCOUNTER — EMERGENCY (EMERGENCY)
Facility: HOSPITAL | Age: 39
LOS: 0 days | Discharge: HOME | End: 2019-03-23
Attending: EMERGENCY MEDICINE | Admitting: EMERGENCY MEDICINE
Payer: MEDICAID

## 2019-03-23 VITALS — HEIGHT: 62 IN | WEIGHT: 149.91 LBS

## 2019-03-23 VITALS
HEART RATE: 92 BPM | SYSTOLIC BLOOD PRESSURE: 119 MMHG | RESPIRATION RATE: 19 BRPM | OXYGEN SATURATION: 100 % | DIASTOLIC BLOOD PRESSURE: 82 MMHG | TEMPERATURE: 99 F

## 2019-03-23 DIAGNOSIS — L04.9 ACUTE LYMPHADENITIS, UNSPECIFIED: ICD-10-CM

## 2019-03-23 DIAGNOSIS — J02.9 ACUTE PHARYNGITIS, UNSPECIFIED: ICD-10-CM

## 2019-03-23 PROCEDURE — 99283 EMERGENCY DEPT VISIT LOW MDM: CPT

## 2019-03-23 RX ORDER — DEXAMETHASONE 0.5 MG/5ML
8 ELIXIR ORAL ONCE
Qty: 0 | Refills: 0 | Status: COMPLETED | OUTPATIENT
Start: 2019-03-23 | End: 2019-03-23

## 2019-03-23 RX ORDER — IBUPROFEN 200 MG
1 TABLET ORAL
Qty: 28 | Refills: 0 | OUTPATIENT
Start: 2019-03-23 | End: 2019-03-29

## 2019-03-23 RX ORDER — IBUPROFEN 200 MG
600 TABLET ORAL ONCE
Qty: 0 | Refills: 0 | Status: COMPLETED | OUTPATIENT
Start: 2019-03-23 | End: 2019-03-23

## 2019-03-23 RX ADMIN — Medication 1 TABLET(S): at 09:18

## 2019-03-23 RX ADMIN — Medication 8 MILLIGRAM(S): at 09:18

## 2019-03-23 RX ADMIN — Medication 600 MILLIGRAM(S): at 09:18

## 2019-03-23 NOTE — ED PROVIDER NOTE - CLINICAL SUMMARY MEDICAL DECISION MAKING FREE TEXT BOX
pt with left sdied swollen painful lymph node will give steroids for swelling, motrin for pain and antibiotics f/u ent

## 2019-03-23 NOTE — ED ADULT NURSE NOTE - OBJECTIVE STATEMENT
Pt states her left neck, left ear and throat are hurting. Left neck noted to be swollen and tender to the touch

## 2019-03-23 NOTE — ED PROVIDER NOTE - CARE PROVIDER_API CALL
Zafar Donaldson)  Otolaryngology  [address] [place] [address]  Phone: [phone]  Fax: [phone]  Follow Up Time:

## 2019-03-23 NOTE — ED ADULT NURSE NOTE - NSIMPLEMENTINTERV_GEN_ALL_ED
Implemented All Universal Safety Interventions:  [place] to call system. Call bell, personal items and telephone within reach. Instruct patient to call for assistance. Room bathroom lighting operational. Non-slip footwear when patient is off stretcher. Physically safe environment: no spills, clutter or unnecessary equipment. Stretcher in lowest position, wheels locked, appropriate side rails in place.

## 2019-03-23 NOTE — ED PROVIDER NOTE - OBJECTIVE STATEMENT
37 yo female pw left ear pain and throat pain that started thursday. pt has ho hypothyroidism. no fever or chills, pt can tolerate her own secreations

## 2019-05-06 ENCOUNTER — APPOINTMENT (OUTPATIENT)
Dept: OTOLARYNGOLOGY | Facility: CLINIC | Age: 39
End: 2019-05-06
Payer: COMMERCIAL

## 2019-05-06 ENCOUNTER — OUTPATIENT (OUTPATIENT)
Dept: OUTPATIENT SERVICES | Facility: HOSPITAL | Age: 39
LOS: 1 days | Discharge: HOME | End: 2019-05-06

## 2019-05-06 VITALS
WEIGHT: 156 LBS | DIASTOLIC BLOOD PRESSURE: 82 MMHG | HEIGHT: 62 IN | HEART RATE: 76 BPM | SYSTOLIC BLOOD PRESSURE: 128 MMHG | BODY MASS INDEX: 28.71 KG/M2

## 2019-05-06 DIAGNOSIS — H91.90 UNSPECIFIED HEARING LOSS, UNSPECIFIED EAR: ICD-10-CM

## 2019-05-06 DIAGNOSIS — H61.21 IMPACTED CERUMEN, RIGHT EAR: ICD-10-CM

## 2019-05-06 DIAGNOSIS — Z80.0 FAMILY HISTORY OF MALIGNANT NEOPLASM OF DIGESTIVE ORGANS: ICD-10-CM

## 2019-05-06 DIAGNOSIS — Z86.79 PERSONAL HISTORY OF OTHER DISEASES OF THE CIRCULATORY SYSTEM: ICD-10-CM

## 2019-05-06 DIAGNOSIS — Z83.3 FAMILY HISTORY OF DIABETES MELLITUS: ICD-10-CM

## 2019-05-06 DIAGNOSIS — H93.8X3 OTHER SPECIFIED DISORDERS OF EAR, BILATERAL: ICD-10-CM

## 2019-05-06 DIAGNOSIS — E03.9 HYPOTHYROIDISM, UNSPECIFIED: ICD-10-CM

## 2019-05-06 DIAGNOSIS — H66.90 OTITIS MEDIA, UNSPECIFIED, UNSPECIFIED EAR: ICD-10-CM

## 2019-05-06 PROCEDURE — 99203 OFFICE O/P NEW LOW 30 MIN: CPT | Mod: 25

## 2019-05-06 PROCEDURE — 69210 REMOVE IMPACTED EAR WAX UNI: CPT

## 2019-05-06 RX ORDER — LEVOTHYROXINE SODIUM 137 UG/1
TABLET ORAL
Refills: 0 | Status: ACTIVE | COMMUNITY

## 2019-05-06 NOTE — PHYSICAL EXAM
[Normal] : cranial nerves 2-12 intact [de-identified] : + TMJ Pain bilaterally on mild palpation [de-identified] : R Cerumen impaction, removed on exam, TMs wnl after cerumen removed

## 2019-05-06 NOTE — REASON FOR VISIT
[Family Member] : family member [Patient Declined  Services] : - None: Patient declined  services [FreeTextEntry2] : Eda León

## 2019-05-06 NOTE — HISTORY OF PRESENT ILLNESS
[de-identified] : 39 yo F with BL otalgia and nasal congestion x 7 months. Denies otorrhea, significant vertigo. Denies weight loss, espitaxis, night sweats. Ear pain is worse when chewing and eating. She reports mild reduction in hearing on R side--reports this is improved after cerumen removal today on exam. Pt reportedly had an episode of epistaxis 6 months ago, but did not have any further episodes.

## 2019-05-13 DIAGNOSIS — H93.8X3 OTHER SPECIFIED DISORDERS OF EAR, BILATERAL: ICD-10-CM

## 2019-05-13 DIAGNOSIS — H61.21 IMPACTED CERUMEN, RIGHT EAR: ICD-10-CM

## 2019-05-13 DIAGNOSIS — M26.609 UNSPECIFIED TEMPOROMANDIBULAR JOINT DISORDER, UNSPECIFIED SIDE: ICD-10-CM

## 2019-07-15 ENCOUNTER — APPOINTMENT (OUTPATIENT)
Dept: OTOLARYNGOLOGY | Facility: CLINIC | Age: 39
End: 2019-07-15
Payer: COMMERCIAL

## 2019-07-15 VITALS
BODY MASS INDEX: 28.71 KG/M2 | WEIGHT: 156 LBS | HEART RATE: 72 BPM | DIASTOLIC BLOOD PRESSURE: 80 MMHG | SYSTOLIC BLOOD PRESSURE: 137 MMHG | HEIGHT: 62 IN

## 2019-07-15 DIAGNOSIS — M26.609 UNSPECIFIED TEMPOROMANDIBULAR JOINT DISORDER: ICD-10-CM

## 2019-07-15 DIAGNOSIS — Z00.00 ENCOUNTER FOR GENERAL ADULT MEDICAL EXAMINATION W/OUT ABNORMAL FINDINGS: ICD-10-CM

## 2019-07-15 DIAGNOSIS — R42 DIZZINESS AND GIDDINESS: ICD-10-CM

## 2019-07-15 DIAGNOSIS — H92.09 OTALGIA, UNSPECIFIED EAR: ICD-10-CM

## 2019-07-15 PROCEDURE — 99214 OFFICE O/P EST MOD 30 MIN: CPT | Mod: 25

## 2019-07-15 PROCEDURE — 92557 COMPREHENSIVE HEARING TEST: CPT

## 2019-07-15 PROCEDURE — 92567 TYMPANOMETRY: CPT

## 2019-07-15 NOTE — REASON FOR VISIT
[Subsequent Evaluation] : a subsequent evaluation for [Family Member] : family member [[employer] ] : provided by [employer]   [Source: ______] : History obtained from [unfilled] [FreeTextEntry1] : [id number] [FreeTextEntry2] : [place]

## 2019-08-02 DIAGNOSIS — Z00.00 ENCOUNTER FOR GENERAL ADULT MEDICAL EXAMINATION WITHOUT ABNORMAL FINDINGS: ICD-10-CM

## 2019-08-02 DIAGNOSIS — H92.09 OTALGIA, UNSPECIFIED EAR: ICD-10-CM

## 2019-08-02 DIAGNOSIS — R42 DIZZINESS AND GIDDINESS: ICD-10-CM

## 2019-08-09 NOTE — ED ADULT NURSE NOTE - OBJECTIVE STATEMENT
Last Appointment   7/12/2019  Next Appointment  10/18/2019
HA, some nausea, states she was here a few weeks ago and seen for same thing, states she followed up w/[facility], pain not relieved.

## 2019-09-23 ENCOUNTER — APPOINTMENT (OUTPATIENT)
Dept: RADIOLOGY | Facility: CLINIC | Age: 39
End: 2019-09-23
Payer: COMMERCIAL

## 2019-09-23 ENCOUNTER — OUTPATIENT (OUTPATIENT)
Dept: OUTPATIENT SERVICES | Facility: HOSPITAL | Age: 39
LOS: 1 days | End: 2019-09-23
Payer: COMMERCIAL

## 2019-09-23 ENCOUNTER — OUTPATIENT (OUTPATIENT)
Dept: OUTPATIENT SERVICES | Facility: HOSPITAL | Age: 39
LOS: 1 days | End: 2019-09-23

## 2019-09-23 ENCOUNTER — APPOINTMENT (OUTPATIENT)
Dept: MAMMOGRAPHY | Facility: CLINIC | Age: 39
End: 2019-09-23
Payer: COMMERCIAL

## 2019-09-23 DIAGNOSIS — Z00.8 ENCOUNTER FOR OTHER GENERAL EXAMINATION: ICD-10-CM

## 2019-09-23 PROCEDURE — 77067 SCR MAMMO BI INCL CAD: CPT | Mod: 26

## 2019-09-23 PROCEDURE — 72100 X-RAY EXAM L-S SPINE 2/3 VWS: CPT | Mod: 26

## 2019-09-23 PROCEDURE — 77063 BREAST TOMOSYNTHESIS BI: CPT | Mod: 26

## 2019-09-23 PROCEDURE — 77067 SCR MAMMO BI INCL CAD: CPT

## 2019-09-23 PROCEDURE — 72100 X-RAY EXAM L-S SPINE 2/3 VWS: CPT

## 2019-09-23 PROCEDURE — 77063 BREAST TOMOSYNTHESIS BI: CPT

## 2019-10-11 ENCOUNTER — EMERGENCY (EMERGENCY)
Facility: HOSPITAL | Age: 39
LOS: 0 days | Discharge: HOME | End: 2019-10-12
Attending: EMERGENCY MEDICINE
Payer: MEDICAID

## 2019-10-11 VITALS — HEIGHT: 61 IN | WEIGHT: 149.91 LBS

## 2019-10-11 DIAGNOSIS — Z11.2 ENCOUNTER FOR SCREENING FOR OTHER BACTERIAL DISEASES: ICD-10-CM

## 2019-10-11 DIAGNOSIS — R10.30 LOWER ABDOMINAL PAIN, UNSPECIFIED: ICD-10-CM

## 2019-10-11 DIAGNOSIS — R10.32 LEFT LOWER QUADRANT PAIN: ICD-10-CM

## 2019-10-11 LAB
ALBUMIN SERPL ELPH-MCNC: 4.1 G/DL — SIGNIFICANT CHANGE UP (ref 3.3–5)
ALP SERPL-CCNC: 82 U/L — SIGNIFICANT CHANGE UP (ref 40–120)
ALT FLD-CCNC: 60 U/L — SIGNIFICANT CHANGE UP (ref 12–78)
ANION GAP SERPL CALC-SCNC: 6 MMOL/L — SIGNIFICANT CHANGE UP (ref 5–17)
APPEARANCE UR: CLEAR — SIGNIFICANT CHANGE UP
AST SERPL-CCNC: 52 U/L — HIGH (ref 15–37)
BASOPHILS # BLD AUTO: 0.07 K/UL — SIGNIFICANT CHANGE UP (ref 0–0.2)
BASOPHILS NFR BLD AUTO: 0.9 % — SIGNIFICANT CHANGE UP (ref 0–2)
BILIRUB SERPL-MCNC: 0.4 MG/DL — SIGNIFICANT CHANGE UP (ref 0.2–1.2)
BILIRUB UR-MCNC: NEGATIVE — SIGNIFICANT CHANGE UP
BUN SERPL-MCNC: 11 MG/DL — SIGNIFICANT CHANGE UP (ref 7–23)
CALCIUM SERPL-MCNC: 8.8 MG/DL — SIGNIFICANT CHANGE UP (ref 8.5–10.1)
CHLORIDE SERPL-SCNC: 106 MMOL/L — SIGNIFICANT CHANGE UP (ref 96–108)
CO2 SERPL-SCNC: 27 MMOL/L — SIGNIFICANT CHANGE UP (ref 22–31)
COLOR SPEC: YELLOW — SIGNIFICANT CHANGE UP
CREAT SERPL-MCNC: 0.65 MG/DL — SIGNIFICANT CHANGE UP (ref 0.5–1.3)
DIFF PNL FLD: ABNORMAL
EOSINOPHIL # BLD AUTO: 0.31 K/UL — SIGNIFICANT CHANGE UP (ref 0–0.5)
EOSINOPHIL NFR BLD AUTO: 4.1 % — SIGNIFICANT CHANGE UP (ref 0–6)
GLUCOSE SERPL-MCNC: 101 MG/DL — HIGH (ref 70–99)
GLUCOSE UR QL: NEGATIVE MG/DL — SIGNIFICANT CHANGE UP
HCT VFR BLD CALC: 39.9 % — SIGNIFICANT CHANGE UP (ref 34.5–45)
HGB BLD-MCNC: 13.2 G/DL — SIGNIFICANT CHANGE UP (ref 11.5–15.5)
IMM GRANULOCYTES NFR BLD AUTO: 0.4 % — SIGNIFICANT CHANGE UP (ref 0–1.5)
KETONES UR-MCNC: NEGATIVE — SIGNIFICANT CHANGE UP
LACTATE SERPL-SCNC: 1.2 MMOL/L — SIGNIFICANT CHANGE UP (ref 0.7–2)
LEUKOCYTE ESTERASE UR-ACNC: NEGATIVE — SIGNIFICANT CHANGE UP
LIDOCAIN IGE QN: 84 U/L — SIGNIFICANT CHANGE UP (ref 73–393)
LYMPHOCYTES # BLD AUTO: 3.2 K/UL — SIGNIFICANT CHANGE UP (ref 1–3.3)
LYMPHOCYTES # BLD AUTO: 42.8 % — SIGNIFICANT CHANGE UP (ref 13–44)
MCHC RBC-ENTMCNC: 29.5 PG — SIGNIFICANT CHANGE UP (ref 27–34)
MCHC RBC-ENTMCNC: 33.1 GM/DL — SIGNIFICANT CHANGE UP (ref 32–36)
MCV RBC AUTO: 89.1 FL — SIGNIFICANT CHANGE UP (ref 80–100)
MONOCYTES # BLD AUTO: 0.47 K/UL — SIGNIFICANT CHANGE UP (ref 0–0.9)
MONOCYTES NFR BLD AUTO: 6.3 % — SIGNIFICANT CHANGE UP (ref 2–14)
NEUTROPHILS # BLD AUTO: 3.4 K/UL — SIGNIFICANT CHANGE UP (ref 1.8–7.4)
NEUTROPHILS NFR BLD AUTO: 45.5 % — SIGNIFICANT CHANGE UP (ref 43–77)
NITRITE UR-MCNC: NEGATIVE — SIGNIFICANT CHANGE UP
PH UR: 7 — SIGNIFICANT CHANGE UP (ref 5–8)
PLATELET # BLD AUTO: 250 K/UL — SIGNIFICANT CHANGE UP (ref 150–400)
POTASSIUM SERPL-MCNC: 4.1 MMOL/L — SIGNIFICANT CHANGE UP (ref 3.5–5.3)
POTASSIUM SERPL-SCNC: 4.1 MMOL/L — SIGNIFICANT CHANGE UP (ref 3.5–5.3)
PROT SERPL-MCNC: 7.6 GM/DL — SIGNIFICANT CHANGE UP (ref 6–8.3)
PROT UR-MCNC: NEGATIVE MG/DL — SIGNIFICANT CHANGE UP
RBC # BLD: 4.48 M/UL — SIGNIFICANT CHANGE UP (ref 3.8–5.2)
RBC # FLD: 12.3 % — SIGNIFICANT CHANGE UP (ref 10.3–14.5)
SODIUM SERPL-SCNC: 139 MMOL/L — SIGNIFICANT CHANGE UP (ref 135–145)
SP GR SPEC: 1.01 — SIGNIFICANT CHANGE UP (ref 1.01–1.02)
UROBILINOGEN FLD QL: NEGATIVE MG/DL — SIGNIFICANT CHANGE UP
WBC # BLD: 7.48 K/UL — SIGNIFICANT CHANGE UP (ref 3.8–10.5)
WBC # FLD AUTO: 7.48 K/UL — SIGNIFICANT CHANGE UP (ref 3.8–10.5)

## 2019-10-11 PROCEDURE — 74176 CT ABD & PELVIS W/O CONTRAST: CPT

## 2019-10-11 PROCEDURE — 81001 URINALYSIS AUTO W/SCOPE: CPT

## 2019-10-11 PROCEDURE — 96374 THER/PROPH/DIAG INJ IV PUSH: CPT

## 2019-10-11 PROCEDURE — 36415 COLL VENOUS BLD VENIPUNCTURE: CPT

## 2019-10-11 PROCEDURE — 74176 CT ABD & PELVIS W/O CONTRAST: CPT | Mod: 26

## 2019-10-11 PROCEDURE — 87086 URINE CULTURE/COLONY COUNT: CPT

## 2019-10-11 PROCEDURE — 83690 ASSAY OF LIPASE: CPT

## 2019-10-11 PROCEDURE — 80053 COMPREHEN METABOLIC PANEL: CPT

## 2019-10-11 PROCEDURE — 99284 EMERGENCY DEPT VISIT MOD MDM: CPT | Mod: 25

## 2019-10-11 PROCEDURE — 99284 EMERGENCY DEPT VISIT MOD MDM: CPT

## 2019-10-11 PROCEDURE — 83605 ASSAY OF LACTIC ACID: CPT

## 2019-10-11 PROCEDURE — 85025 COMPLETE CBC W/AUTO DIFF WBC: CPT

## 2019-10-11 RX ORDER — SODIUM CHLORIDE 9 MG/ML
1000 INJECTION INTRAMUSCULAR; INTRAVENOUS; SUBCUTANEOUS ONCE
Refills: 0 | Status: COMPLETED | OUTPATIENT
Start: 2019-10-11 | End: 2019-10-11

## 2019-10-11 RX ORDER — MORPHINE SULFATE 50 MG/1
4 CAPSULE, EXTENDED RELEASE ORAL ONCE
Refills: 0 | Status: DISCONTINUED | OUTPATIENT
Start: 2019-10-11 | End: 2019-10-11

## 2019-10-11 RX ADMIN — MORPHINE SULFATE 4 MILLIGRAM(S): 50 CAPSULE, EXTENDED RELEASE ORAL at 23:35

## 2019-10-11 RX ADMIN — SODIUM CHLORIDE 1000 MILLILITER(S): 9 INJECTION INTRAMUSCULAR; INTRAVENOUS; SUBCUTANEOUS at 23:06

## 2019-10-11 RX ADMIN — MORPHINE SULFATE 4 MILLIGRAM(S): 50 CAPSULE, EXTENDED RELEASE ORAL at 23:06

## 2019-10-11 NOTE — ED ADULT TRIAGE NOTE - CHIEF COMPLAINT QUOTE
lower back pain radiating to abdomen x 3 weeks. Pain is worsening today. Denies nausea, vomiting, fever/chills. (+) dizziness. hx cholecystectomy

## 2019-10-11 NOTE — ED STATDOCS - ATTENDING CONTRIBUTION TO CARE
I Eduard Hope MD saw and examined the patient. MLP saw and examined the patient under my supervision. I discussed the care of the patient with MLP and agree with MLP's plan, assessment and care of the patient while in the ED.

## 2019-10-11 NOTE — ED STATDOCS - NS_ ATTENDINGSCRIBEDETAILS _ED_A_ED_FT
I Eduard Hope MD saw and examined the patient. Scribe documented for me and under my supervision. I have modified the scribe's documentation where necessary to reflect my history, physical exam and other relevant documentations pertinent to the care of the patient.

## 2019-10-11 NOTE — ED STATDOCS - CARE PLAN
Principal Discharge DX:	Abdominal pain, unspecified abdominal location  Secondary Diagnosis:	Flank pain

## 2019-10-11 NOTE — ED STATDOCS - GASTROINTESTINAL, MLM
abdomen soft, and non-distended. Bowel sounds present. +bilateral CVA TTP +mild LLQ TTP. No TTP RLQ.

## 2019-10-11 NOTE — ED ADULT NURSE NOTE - OBJECTIVE STATEMENT
Patient presents to the ED c/o back pain and left lower abd pain x3 weeks, worst today. Denies hematochezia, vaginal bleeding. +dysuria. +hematuria. +chills.

## 2019-10-11 NOTE — ED STATDOCS - PROGRESS NOTE DETAILS
38 yo female with a PMH of hypothyroid presents with b/l lower back pain and L lower abd pain x 3 weeks with dysuria, hematuria, chills. Denies fever, vomiting, cp, sob. Labs, CT, UA, reeval. -Bruce Campos PA-C

## 2019-10-11 NOTE — ED STATDOCS - OBJECTIVE STATEMENT
38 y/o female with a PMHx of hypothyroidism presents to the ED c/o back pain and left lower abd pain x3 weeks, worst today. Denies hematochezia, vaginal bleeding. +dysuria. +hematuria. +chills. PMD- Dr. Santiago. 40 y/o female with a PMHx of hypothyroidism presents to the ED c/o back pain and left lower abd pain x3 weeks, worst today. Denies hematochezia, vaginal bleeding. +dysuria. +hematuria. +chills. No nuchal rigidity. No visual problems. No complaints of any focal neurological problems. PMD- Dr. Santiago.

## 2019-10-11 NOTE — ED ADULT NURSE NOTE - NSIMPLEMENTINTERV_GEN_ALL_ED
Implemented All Universal Safety Interventions:  [place] to call system. Call bell, personal items and telephone within reach. Instruct patient to call for assistance. Room bathroom lighting operational. Non-slip footwear when patient is off stretcher. Physically safe environment: no spills, clutter or unnecessary equipment. Stretcher in lowest position, wheels locked, appropriate side rails in place.

## 2019-10-12 VITALS
TEMPERATURE: 98 F | RESPIRATION RATE: 18 BRPM | OXYGEN SATURATION: 100 % | HEART RATE: 101 BPM | SYSTOLIC BLOOD PRESSURE: 122 MMHG | DIASTOLIC BLOOD PRESSURE: 78 MMHG

## 2019-10-12 VITALS — WEIGHT: 160.06 LBS | HEIGHT: 62 IN

## 2019-10-12 VITALS
DIASTOLIC BLOOD PRESSURE: 67 MMHG | HEART RATE: 73 BPM | OXYGEN SATURATION: 100 % | RESPIRATION RATE: 16 BRPM | SYSTOLIC BLOOD PRESSURE: 111 MMHG

## 2019-10-12 DIAGNOSIS — E03.9 HYPOTHYROIDISM, UNSPECIFIED: ICD-10-CM

## 2019-10-12 DIAGNOSIS — R51 HEADACHE: ICD-10-CM

## 2019-10-12 DIAGNOSIS — R11.2 NAUSEA WITH VOMITING, UNSPECIFIED: ICD-10-CM

## 2019-10-12 DIAGNOSIS — Z90.49 ACQUIRED ABSENCE OF OTHER SPECIFIED PARTS OF DIGESTIVE TRACT: Chronic | ICD-10-CM

## 2019-10-12 LAB
ALBUMIN SERPL ELPH-MCNC: 4.3 G/DL — SIGNIFICANT CHANGE UP (ref 3.3–5)
ALP SERPL-CCNC: 85 U/L — SIGNIFICANT CHANGE UP (ref 40–120)
ALT FLD-CCNC: 74 U/L — SIGNIFICANT CHANGE UP (ref 12–78)
ANION GAP SERPL CALC-SCNC: 6 MMOL/L — SIGNIFICANT CHANGE UP (ref 5–17)
AST SERPL-CCNC: 64 U/L — HIGH (ref 15–37)
BASOPHILS # BLD AUTO: 0.06 K/UL — SIGNIFICANT CHANGE UP (ref 0–0.2)
BASOPHILS NFR BLD AUTO: 0.5 % — SIGNIFICANT CHANGE UP (ref 0–2)
BILIRUB SERPL-MCNC: 0.6 MG/DL — SIGNIFICANT CHANGE UP (ref 0.2–1.2)
BUN SERPL-MCNC: 8 MG/DL — SIGNIFICANT CHANGE UP (ref 7–23)
CALCIUM SERPL-MCNC: 8.3 MG/DL — LOW (ref 8.5–10.1)
CHLORIDE SERPL-SCNC: 105 MMOL/L — SIGNIFICANT CHANGE UP (ref 96–108)
CO2 SERPL-SCNC: 26 MMOL/L — SIGNIFICANT CHANGE UP (ref 22–31)
CREAT SERPL-MCNC: 0.6 MG/DL — SIGNIFICANT CHANGE UP (ref 0.5–1.3)
EOSINOPHIL # BLD AUTO: 0.06 K/UL — SIGNIFICANT CHANGE UP (ref 0–0.5)
EOSINOPHIL NFR BLD AUTO: 0.5 % — SIGNIFICANT CHANGE UP (ref 0–6)
GLUCOSE SERPL-MCNC: 107 MG/DL — HIGH (ref 70–99)
HCT VFR BLD CALC: 40.5 % — SIGNIFICANT CHANGE UP (ref 34.5–45)
HGB BLD-MCNC: 13.5 G/DL — SIGNIFICANT CHANGE UP (ref 11.5–15.5)
IMM GRANULOCYTES NFR BLD AUTO: 0.5 % — SIGNIFICANT CHANGE UP (ref 0–1.5)
LIDOCAIN IGE QN: 55 U/L — LOW (ref 73–393)
LYMPHOCYTES # BLD AUTO: 1.35 K/UL — SIGNIFICANT CHANGE UP (ref 1–3.3)
LYMPHOCYTES # BLD AUTO: 11.4 % — LOW (ref 13–44)
MCHC RBC-ENTMCNC: 29.9 PG — SIGNIFICANT CHANGE UP (ref 27–34)
MCHC RBC-ENTMCNC: 33.3 GM/DL — SIGNIFICANT CHANGE UP (ref 32–36)
MCV RBC AUTO: 89.6 FL — SIGNIFICANT CHANGE UP (ref 80–100)
MONOCYTES # BLD AUTO: 0.42 K/UL — SIGNIFICANT CHANGE UP (ref 0–0.9)
MONOCYTES NFR BLD AUTO: 3.6 % — SIGNIFICANT CHANGE UP (ref 2–14)
NEUTROPHILS # BLD AUTO: 9.85 K/UL — HIGH (ref 1.8–7.4)
NEUTROPHILS NFR BLD AUTO: 83.5 % — HIGH (ref 43–77)
PLATELET # BLD AUTO: 245 K/UL — SIGNIFICANT CHANGE UP (ref 150–400)
POTASSIUM SERPL-MCNC: 3.7 MMOL/L — SIGNIFICANT CHANGE UP (ref 3.5–5.3)
POTASSIUM SERPL-SCNC: 3.7 MMOL/L — SIGNIFICANT CHANGE UP (ref 3.5–5.3)
PROT SERPL-MCNC: 7.9 GM/DL — SIGNIFICANT CHANGE UP (ref 6–8.3)
RBC # BLD: 4.52 M/UL — SIGNIFICANT CHANGE UP (ref 3.8–5.2)
RBC # FLD: 12.4 % — SIGNIFICANT CHANGE UP (ref 10.3–14.5)
SODIUM SERPL-SCNC: 137 MMOL/L — SIGNIFICANT CHANGE UP (ref 135–145)
WBC # BLD: 11.8 K/UL — HIGH (ref 3.8–10.5)
WBC # FLD AUTO: 11.8 K/UL — HIGH (ref 3.8–10.5)

## 2019-10-12 PROCEDURE — 70450 CT HEAD/BRAIN W/O DYE: CPT

## 2019-10-12 PROCEDURE — 99284 EMERGENCY DEPT VISIT MOD MDM: CPT

## 2019-10-12 PROCEDURE — 96375 TX/PRO/DX INJ NEW DRUG ADDON: CPT

## 2019-10-12 PROCEDURE — 85025 COMPLETE CBC W/AUTO DIFF WBC: CPT

## 2019-10-12 PROCEDURE — 83690 ASSAY OF LIPASE: CPT

## 2019-10-12 PROCEDURE — 96374 THER/PROPH/DIAG INJ IV PUSH: CPT

## 2019-10-12 PROCEDURE — 36415 COLL VENOUS BLD VENIPUNCTURE: CPT

## 2019-10-12 PROCEDURE — 70450 CT HEAD/BRAIN W/O DYE: CPT | Mod: 26

## 2019-10-12 PROCEDURE — 99284 EMERGENCY DEPT VISIT MOD MDM: CPT | Mod: 25

## 2019-10-12 PROCEDURE — 80053 COMPREHEN METABOLIC PANEL: CPT

## 2019-10-12 RX ORDER — FAMOTIDINE 10 MG/ML
20 INJECTION INTRAVENOUS ONCE
Refills: 0 | Status: COMPLETED | OUTPATIENT
Start: 2019-10-12 | End: 2019-10-12

## 2019-10-12 RX ORDER — METOCLOPRAMIDE HCL 10 MG
10 TABLET ORAL ONCE
Refills: 0 | Status: COMPLETED | OUTPATIENT
Start: 2019-10-12 | End: 2019-10-12

## 2019-10-12 RX ORDER — OXYCODONE HYDROCHLORIDE 5 MG/1
5 TABLET ORAL ONCE
Refills: 0 | Status: DISCONTINUED | OUTPATIENT
Start: 2019-10-12 | End: 2019-10-12

## 2019-10-12 RX ORDER — ONDANSETRON 8 MG/1
1 TABLET, FILM COATED ORAL
Qty: 9 | Refills: 0
Start: 2019-10-12 | End: 2019-10-14

## 2019-10-12 RX ORDER — DEXAMETHASONE 0.5 MG/5ML
6 ELIXIR ORAL ONCE
Refills: 0 | Status: DISCONTINUED | OUTPATIENT
Start: 2019-10-12 | End: 2019-10-12

## 2019-10-12 RX ORDER — DIPHENHYDRAMINE HCL 50 MG
50 CAPSULE ORAL ONCE
Refills: 0 | Status: COMPLETED | OUTPATIENT
Start: 2019-10-12 | End: 2019-10-12

## 2019-10-12 RX ORDER — SODIUM CHLORIDE 9 MG/ML
1000 INJECTION INTRAMUSCULAR; INTRAVENOUS; SUBCUTANEOUS ONCE
Refills: 0 | Status: COMPLETED | OUTPATIENT
Start: 2019-10-12 | End: 2019-10-12

## 2019-10-12 RX ADMIN — OXYCODONE HYDROCHLORIDE 5 MILLIGRAM(S): 5 TABLET ORAL at 01:38

## 2019-10-12 RX ADMIN — Medication 10 MILLIGRAM(S): at 11:02

## 2019-10-12 RX ADMIN — SODIUM CHLORIDE 1000 MILLILITER(S): 9 INJECTION INTRAMUSCULAR; INTRAVENOUS; SUBCUTANEOUS at 11:05

## 2019-10-12 RX ADMIN — Medication 50 MILLIGRAM(S): at 11:01

## 2019-10-12 RX ADMIN — Medication 30 MILLILITER(S): at 11:04

## 2019-10-12 RX ADMIN — FAMOTIDINE 20 MILLIGRAM(S): 10 INJECTION INTRAVENOUS at 11:03

## 2019-10-12 NOTE — ED PROVIDER NOTE - PROGRESS NOTE DETAILS
patient feeling better. no headache. no concern for SAH at this time give hx and exam and normal ct within 2 hours symptom onset as well as gradual onset no neck pain non-exertional not maximum at onset. abd soft non-tender. pt still with some nausea though - will d/c with GI follow up and strict return precations

## 2019-10-12 NOTE — ED PROVIDER NOTE - CLINICAL SUMMARY MEDICAL DECISION MAKING FREE TEXT BOX
38 y/o female presents to the ED for headache, n/v. Pt was seen in ED last night for abdominal pain. Had blood work, urine and CT, all which were negative, was sent home. At 7AM, had persistent vomiting and headache that started. Headache only presents in vomiting and then resolves. No fever, neck pain, skin rash. Exam with mild LUQ TTP, no rebound or guarding. Normal neuro exam. Likely headache secondary due to dehydration from vomiting. Will obtain labs, sx control, reassess. 38 y/o female presents to the ED for headache, n/v. Pt was seen in ED last night for abdominal pain. Had blood work, urine and CT, all which were negative, was sent home. At 7AM, had persistent vomiting and headache that started. Headache only presents in vomiting and then resolves. No fever, neck pain, skin rash. Exam with mild LUQ TTP, no rebound or guarding. Normal neuro exam. Likely headache secondary due to dehydration from vomiting. very low suspicion SAH and headache within 2 hours of ct no neck pain or stiffness not maximal at onset. Will obtain labs, sx control, reassess.

## 2019-10-12 NOTE — ED PROVIDER NOTE - OBJECTIVE STATEMENT
40 y/o female with PMHx of hypothyroidism, s/p cholecystectomy 8 years ago in [place] presents to the ED c/o headache on the way to work this AM. Seen at 8PM last night at [facility] for left sided abd pain, n/v. Had labs drawn, urine tested, and CT done, which were all normal, was given pain medications and DC'ed at 2AM. Today, pt was experiencing persisting sx from yesterday, abdominal pain is now diffuse. +vomiting x6 episodes starting at 7AM, followed by abdominal pain and headache. Headache worsens with vomiting. Denies fever, chest pain, SOB, dysuria, cough, congestion, runny nose, sore throat. No recent sick contacts at home. Non smoker, no EtOH use. NKDA. [employer]  used, ID#: [id number].

## 2019-10-12 NOTE — ED PROVIDER NOTE - NS ED ROS FT
Constitutional: No fever or chills  Eyes: No visual changes  HEENT: No throat pain  CV: No chest pain  Resp: No SOB no cough  GI: +abd pain, +nausea +vomiting  : No dysuria  MSK: No musculoskeletal pain  Skin: No rash  Neuro: +headache

## 2019-10-12 NOTE — ED PROVIDER NOTE - PHYSICAL EXAMINATION
Constitutional: mild distress AAOx3  Eyes: PERRLA EOMI  Head: Normocephalic atraumatic  Mouth: MMM  Cardiac: regular rate   Resp: Lungs CTAB  GI: Abd s/nd +mild LUQ TTP, no rebound or guarding  Neuro: CN2-12 intact  Skin: No rashes Constitutional: mild distress AAOx3  Eyes: PERRLA EOMI  Head: Normocephalic atraumatic  Mouth: MMM  Cardiac: regular rate   Resp: Lungs CTAB  GI: Abd s/nd +mild LUQ TTP, no rebound or guarding  Neuro: CN2-12 intact normal strength sensation coordination  Skin: No rashes

## 2019-10-12 NOTE — ED ADULT NURSE NOTE - CHPI ED NUR SYMPTOMS NEG
no dysuria/no hematuria/no diarrhea/no fever/no abdominal distension/no blood in stool/no chills/no burning urination

## 2019-10-12 NOTE — ED PROVIDER NOTE - NS_ ATTENDINGSCRIBEDETAILS _ED_A_ED_FT
I, Nick Wong MD,  performed the initial face to face bedside interview with this patient regarding history of present illness, review of symptoms and relevant past medical, social and family history.  I completed an independent physical examination.  I was the initial provider who evaluated this patient.  The history, relevant review of systems, past medical and surgical history, medical decision making, and physical examination was documented by the scribe in my presence and I attest to the accuracy of the documentation.

## 2019-10-12 NOTE — ED PROVIDER NOTE - PATIENT PORTAL LINK FT
You can access the FollowMyHealth Patient Portal offered by [facility] by registering at the following website: [email or web address]. By joining [facility]’s FollowMyHealth portal, you will also be able to view your health information using other applications (apps) compatible with our system.

## 2019-10-12 NOTE — ED ADULT NURSE NOTE - OBJECTIVE STATEMENT
pt is 40 yo female with no pmhx presents to er for abd pain, HA for 3 weeks, denies fever, chills, or any urinary symptoms.

## 2019-10-13 LAB
CULTURE RESULTS: SIGNIFICANT CHANGE UP
SPECIMEN SOURCE: SIGNIFICANT CHANGE UP

## 2019-10-28 ENCOUNTER — APPOINTMENT (OUTPATIENT)
Dept: ULTRASOUND IMAGING | Facility: CLINIC | Age: 39
End: 2019-10-28
Payer: COMMERCIAL

## 2019-10-28 ENCOUNTER — OUTPATIENT (OUTPATIENT)
Dept: OUTPATIENT SERVICES | Facility: HOSPITAL | Age: 39
LOS: 1 days | End: 2019-10-28
Payer: COMMERCIAL

## 2019-10-28 DIAGNOSIS — Z00.8 ENCOUNTER FOR OTHER GENERAL EXAMINATION: ICD-10-CM

## 2019-10-28 DIAGNOSIS — Z90.49 ACQUIRED ABSENCE OF OTHER SPECIFIED PARTS OF DIGESTIVE TRACT: Chronic | ICD-10-CM

## 2019-10-28 PROCEDURE — 76830 TRANSVAGINAL US NON-OB: CPT | Mod: 26

## 2019-10-28 PROCEDURE — 76856 US EXAM PELVIC COMPLETE: CPT

## 2019-10-28 PROCEDURE — 76830 TRANSVAGINAL US NON-OB: CPT

## 2019-10-28 PROCEDURE — 76856 US EXAM PELVIC COMPLETE: CPT | Mod: 26

## 2019-10-31 ENCOUNTER — OUTPATIENT (OUTPATIENT)
Dept: OUTPATIENT SERVICES | Facility: HOSPITAL | Age: 39
LOS: 1 days | End: 2019-10-31
Payer: MEDICAID

## 2019-10-31 ENCOUNTER — APPOINTMENT (OUTPATIENT)
Dept: MRI IMAGING | Facility: CLINIC | Age: 39
End: 2019-10-31
Payer: COMMERCIAL

## 2019-10-31 DIAGNOSIS — Z00.8 ENCOUNTER FOR OTHER GENERAL EXAMINATION: ICD-10-CM

## 2019-10-31 DIAGNOSIS — Z90.49 ACQUIRED ABSENCE OF OTHER SPECIFIED PARTS OF DIGESTIVE TRACT: Chronic | ICD-10-CM

## 2019-10-31 PROCEDURE — 72148 MRI LUMBAR SPINE W/O DYE: CPT

## 2019-10-31 PROCEDURE — 72148 MRI LUMBAR SPINE W/O DYE: CPT | Mod: 26

## 2019-12-06 ENCOUNTER — OUTPATIENT (OUTPATIENT)
Dept: OUTPATIENT SERVICES | Facility: HOSPITAL | Age: 39
LOS: 1 days | End: 2019-12-06
Payer: SELF-PAY

## 2019-12-06 ENCOUNTER — APPOINTMENT (OUTPATIENT)
Dept: RHEUMATOLOGY | Facility: HOSPITAL | Age: 39
End: 2019-12-06

## 2019-12-06 VITALS
HEIGHT: 62 IN | DIASTOLIC BLOOD PRESSURE: 83 MMHG | HEART RATE: 74 BPM | SYSTOLIC BLOOD PRESSURE: 122 MMHG | BODY MASS INDEX: 27.6 KG/M2 | WEIGHT: 150 LBS

## 2019-12-06 DIAGNOSIS — M54.5 LOW BACK PAIN: ICD-10-CM

## 2019-12-06 DIAGNOSIS — M06.9 RHEUMATOID ARTHRITIS, UNSPECIFIED: ICD-10-CM

## 2019-12-06 DIAGNOSIS — Z90.49 ACQUIRED ABSENCE OF OTHER SPECIFIED PARTS OF DIGESTIVE TRACT: Chronic | ICD-10-CM

## 2019-12-06 LAB
ALBUMIN SERPL ELPH-MCNC: 4.7 G/DL — SIGNIFICANT CHANGE UP (ref 3.3–5)
ALP SERPL-CCNC: 81 U/L — SIGNIFICANT CHANGE UP (ref 40–120)
ALT FLD-CCNC: 16 U/L — SIGNIFICANT CHANGE UP (ref 10–45)
ANION GAP SERPL CALC-SCNC: 12 MMOL/L — SIGNIFICANT CHANGE UP (ref 5–17)
AST SERPL-CCNC: 17 U/L — SIGNIFICANT CHANGE UP (ref 10–40)
BILIRUB SERPL-MCNC: 0.3 MG/DL — SIGNIFICANT CHANGE UP (ref 0.2–1.2)
BUN SERPL-MCNC: 9 MG/DL — SIGNIFICANT CHANGE UP (ref 7–23)
CALCIUM SERPL-MCNC: 9.5 MG/DL — SIGNIFICANT CHANGE UP (ref 8.4–10.5)
CHLORIDE SERPL-SCNC: 103 MMOL/L — SIGNIFICANT CHANGE UP (ref 96–108)
CO2 SERPL-SCNC: 26 MMOL/L — SIGNIFICANT CHANGE UP (ref 22–31)
CREAT SERPL-MCNC: 0.62 MG/DL — SIGNIFICANT CHANGE UP (ref 0.5–1.3)
GLUCOSE SERPL-MCNC: 104 MG/DL — HIGH (ref 70–99)
POTASSIUM SERPL-MCNC: 4.8 MMOL/L — SIGNIFICANT CHANGE UP (ref 3.5–5.3)
POTASSIUM SERPL-SCNC: 4.8 MMOL/L — SIGNIFICANT CHANGE UP (ref 3.5–5.3)
PROT SERPL-MCNC: 7 G/DL — SIGNIFICANT CHANGE UP (ref 6–8.3)
SODIUM SERPL-SCNC: 141 MMOL/L — SIGNIFICANT CHANGE UP (ref 135–145)

## 2019-12-06 PROCEDURE — G0463: CPT

## 2019-12-06 PROCEDURE — 81374 HLA I TYPING 1 ANTIGEN LR: CPT

## 2019-12-06 PROCEDURE — 80053 COMPREHEN METABOLIC PANEL: CPT

## 2019-12-06 RX ORDER — METHOCARBAMOL 500 MG/1
500 TABLET, FILM COATED ORAL
Qty: 360 | Refills: 3 | Status: ACTIVE | COMMUNITY
Start: 2019-12-06 | End: 1900-01-01

## 2019-12-06 RX ORDER — ACETAMINOPHEN 500 MG/1
500 TABLET, COATED ORAL EVERY 8 HOURS
Refills: 0 | Status: ACTIVE | COMMUNITY
Start: 2019-12-06

## 2019-12-06 RX ORDER — IBUPROFEN 800 MG/1
TABLET, FILM COATED ORAL
Refills: 0 | Status: DISCONTINUED | COMMUNITY
End: 2019-12-06

## 2019-12-09 DIAGNOSIS — M54.5 LOW BACK PAIN: ICD-10-CM

## 2019-12-09 LAB — HLA-B27 QL: SIGNIFICANT CHANGE UP

## 2019-12-09 NOTE — PHYSICAL EXAM
[General Appearance - Alert] : alert [Extraocular Movements] : extraocular movements were intact [Hearing Threshold Finger Rub Not [place]] : hearing was normal [Neck Appearance] : the appearance of the neck was normal [Auscultation Breath Sounds / Voice Sounds] : lungs were clear to auscultation bilaterally [Heart Sounds] : normal S1 and S2 [Murmurs] : no murmurs [Abdomen Tenderness] : non-tender [Cervical Lymph Nodes Enlarged Posterior Bilaterally] : posterior cervical [Cervical Lymph Nodes Enlarged Anterior Bilaterally] : anterior cervical [Musculoskeletal - Swelling] : no joint swelling seen [] : no rash [No Focal Deficits] : no focal deficits [Oriented To Time, Place, And Person] : oriented to person, place, and time [FreeTextEntry1] : tenderness of cervical and lumbar spine, intact ROM of neck and spine

## 2019-12-09 NOTE — ASSESSMENT
[FreeTextEntry1] : 40 yo F with hx of hypothyroidism, GERD presenting as initial evaluation for mild sclerosis of SI joints on MRI.\par Hx, physical exam and imaging not consistent with inflammatory back pain.\par \par - will complete w/u with HLA B-27, will also recheck CMP as LFTs slightly elevated\par - home PT exercises\par - methocarbamol sent\par - consider repeating MRI if not improved with conservative management \par \par RTC 1 month\par d/w Dr. Rush

## 2019-12-09 NOTE — HISTORY OF PRESENT ILLNESS
[FreeTextEntry1] : 38 yo F with hx of hypothyroidism, GERD presenting as initial evaluation for mild sclerosis of SI joints on MRI.\par \par : [id number]\par \par Patient stated she has been having LBP for 4 months, started suddenly. No trauma, recent infections. Went to see her PMD, did xrays first which looked OK, then got MRI lumbar spine showing mild sclerosis of SI joints. Was told to come to rheumatology. Was given medicine (mobic?), helped a little. Finished the medicine. Was advised for PT but she hasn't done it.\par \par Low back pain is present in the AM, worsens as the day progresses, worsens with activity. Pain is sharp pain. No sciatica. Neck pain. Buttock pain. Hip pain. No peripheral arthritis, dactylitis, enthesitis. No psoriasis rash. \par \par No uveitis, IBD symptoms like BRBPR, severe abd pain, no palpitations, CP, SOB.\par \par PMHx:\par Hypothyroidism\par GERD\par \par PSurgHx:\par Lap avni\par \par FamHx:\par Unsure autoimmune dz\par \par Allergies:\par NKDA\par \par Meds:\par claritin 10mg daily\par omeprazole 40mg daily\par synthroid 25 mcg daily\par mobic 15mg daily\par cyclobenzaprine 10mg nightly\par \par Social: No smoking, alcohol, drug use. , from [place].

## 2020-01-17 ENCOUNTER — APPOINTMENT (OUTPATIENT)
Dept: RHEUMATOLOGY | Facility: HOSPITAL | Age: 40
End: 2020-01-17

## 2020-01-31 ENCOUNTER — RESULT REVIEW (OUTPATIENT)
Age: 40
End: 2020-01-31

## 2020-04-01 ENCOUNTER — EMERGENCY (EMERGENCY)
Facility: HOSPITAL | Age: 40
LOS: 0 days | Discharge: HOME | End: 2020-04-01
Payer: MEDICAID

## 2020-04-01 VITALS
SYSTOLIC BLOOD PRESSURE: 126 MMHG | RESPIRATION RATE: 16 BRPM | DIASTOLIC BLOOD PRESSURE: 79 MMHG | HEART RATE: 73 BPM | OXYGEN SATURATION: 100 % | TEMPERATURE: 98 F

## 2020-04-01 DIAGNOSIS — R05 COUGH: ICD-10-CM

## 2020-04-01 DIAGNOSIS — B34.9 VIRAL INFECTION, UNSPECIFIED: ICD-10-CM

## 2020-04-01 DIAGNOSIS — J02.9 ACUTE PHARYNGITIS, UNSPECIFIED: ICD-10-CM

## 2020-04-01 DIAGNOSIS — Z90.49 ACQUIRED ABSENCE OF OTHER SPECIFIED PARTS OF DIGESTIVE TRACT: Chronic | ICD-10-CM

## 2020-04-01 DIAGNOSIS — R50.9 FEVER, UNSPECIFIED: ICD-10-CM

## 2020-04-01 DIAGNOSIS — Z11.59 ENCOUNTER FOR SCREENING FOR OTHER VIRAL DISEASES: ICD-10-CM

## 2020-04-01 PROCEDURE — 99283 EMERGENCY DEPT VISIT LOW MDM: CPT

## 2020-04-01 PROCEDURE — 87635 SARS-COV-2 COVID-19 AMP PRB: CPT

## 2020-04-01 NOTE — ED STATDOCS - NSFOLLOWUPINSTRUCTIONS_ED_ALL_ED_FT
How to get your Coronavirus (COVID-19) Testing Results:   Please be advised that you were tested for the coronavirus (COVID-19) in the Emergency Department at [facility].  You are to maintain self-quarantine procedures for 14 days until instructed otherwise by one of our healthcare agents. Please note that the test may take up to 2-4 days to result.  If you do not hear from us within 72 hours and you'd like to check on your results, you can call on of our coronavirus specialists at [address] [place] [address] (available 24/7).  Please DO NOT call the site where you received the test to obtain your results.

## 2020-04-01 NOTE — ED STATDOCS - OBJECTIVE STATEMENT
Pt presents to ED with fever, cough, runny nose, body aches, sore throat x 3 days. Pt not recently exposed to COVID-19. Pt here for testing.

## 2020-04-01 NOTE — ED STATDOCS - PATIENT PORTAL LINK FT
You can access the FollowMyHealth Patient Portal offered by [facility] by registering at the following website: [email or web address]. By joining [employer]’s FollowMyHealth portal, you will also be able to view your health information using other applications (apps) compatible with our system.

## 2020-04-02 LAB — SARS-COV-2 RNA SPEC QL NAA+PROBE: SIGNIFICANT CHANGE UP

## 2020-09-13 ENCOUNTER — EMERGENCY (EMERGENCY)
Facility: HOSPITAL | Age: 40
LOS: 0 days | Discharge: HOME | End: 2020-09-13
Attending: EMERGENCY MEDICINE
Payer: MEDICAID

## 2020-09-13 VITALS
HEART RATE: 76 BPM | HEIGHT: 62 IN | WEIGHT: 169.98 LBS | DIASTOLIC BLOOD PRESSURE: 80 MMHG | TEMPERATURE: 98 F | RESPIRATION RATE: 18 BRPM | OXYGEN SATURATION: 100 % | SYSTOLIC BLOOD PRESSURE: 123 MMHG

## 2020-09-13 DIAGNOSIS — R10.32 LEFT LOWER QUADRANT PAIN: ICD-10-CM

## 2020-09-13 DIAGNOSIS — Z90.49 ACQUIRED ABSENCE OF OTHER SPECIFIED PARTS OF DIGESTIVE TRACT: ICD-10-CM

## 2020-09-13 DIAGNOSIS — R11.0 NAUSEA: ICD-10-CM

## 2020-09-13 DIAGNOSIS — Z90.49 ACQUIRED ABSENCE OF OTHER SPECIFIED PARTS OF DIGESTIVE TRACT: Chronic | ICD-10-CM

## 2020-09-13 DIAGNOSIS — R42 DIZZINESS AND GIDDINESS: ICD-10-CM

## 2020-09-13 DIAGNOSIS — E03.9 HYPOTHYROIDISM, UNSPECIFIED: ICD-10-CM

## 2020-09-13 LAB
ALBUMIN SERPL ELPH-MCNC: 3.8 G/DL — SIGNIFICANT CHANGE UP (ref 3.3–5)
ALP SERPL-CCNC: 80 U/L — SIGNIFICANT CHANGE UP (ref 40–120)
ALT FLD-CCNC: 22 U/L — SIGNIFICANT CHANGE UP (ref 12–78)
ANION GAP SERPL CALC-SCNC: 3 MMOL/L — LOW (ref 5–17)
APPEARANCE UR: CLEAR — SIGNIFICANT CHANGE UP
AST SERPL-CCNC: 12 U/L — LOW (ref 15–37)
BASOPHILS # BLD AUTO: 0.07 K/UL — SIGNIFICANT CHANGE UP (ref 0–0.2)
BASOPHILS NFR BLD AUTO: 0.8 % — SIGNIFICANT CHANGE UP (ref 0–2)
BILIRUB SERPL-MCNC: 0.4 MG/DL — SIGNIFICANT CHANGE UP (ref 0.2–1.2)
BILIRUB UR-MCNC: NEGATIVE — SIGNIFICANT CHANGE UP
BUN SERPL-MCNC: 9 MG/DL — SIGNIFICANT CHANGE UP (ref 7–23)
CALCIUM SERPL-MCNC: 8.3 MG/DL — LOW (ref 8.5–10.1)
CHLORIDE SERPL-SCNC: 111 MMOL/L — HIGH (ref 96–108)
CO2 SERPL-SCNC: 25 MMOL/L — SIGNIFICANT CHANGE UP (ref 22–31)
COLOR SPEC: YELLOW — SIGNIFICANT CHANGE UP
CREAT SERPL-MCNC: 0.66 MG/DL — SIGNIFICANT CHANGE UP (ref 0.5–1.3)
DIFF PNL FLD: ABNORMAL
EOSINOPHIL # BLD AUTO: 0.21 K/UL — SIGNIFICANT CHANGE UP (ref 0–0.5)
EOSINOPHIL NFR BLD AUTO: 2.4 % — SIGNIFICANT CHANGE UP (ref 0–6)
GLUCOSE SERPL-MCNC: 92 MG/DL — SIGNIFICANT CHANGE UP (ref 70–99)
GLUCOSE UR QL: NEGATIVE MG/DL — SIGNIFICANT CHANGE UP
HCT VFR BLD CALC: 38.3 % — SIGNIFICANT CHANGE UP (ref 34.5–45)
HGB BLD-MCNC: 12.5 G/DL — SIGNIFICANT CHANGE UP (ref 11.5–15.5)
IMM GRANULOCYTES NFR BLD AUTO: 0.3 % — SIGNIFICANT CHANGE UP (ref 0–1.5)
KETONES UR-MCNC: NEGATIVE — SIGNIFICANT CHANGE UP
LEUKOCYTE ESTERASE UR-ACNC: NEGATIVE — SIGNIFICANT CHANGE UP
LYMPHOCYTES # BLD AUTO: 2.97 K/UL — SIGNIFICANT CHANGE UP (ref 1–3.3)
LYMPHOCYTES # BLD AUTO: 34.3 % — SIGNIFICANT CHANGE UP (ref 13–44)
MCHC RBC-ENTMCNC: 29.6 PG — SIGNIFICANT CHANGE UP (ref 27–34)
MCHC RBC-ENTMCNC: 32.6 GM/DL — SIGNIFICANT CHANGE UP (ref 32–36)
MCV RBC AUTO: 90.5 FL — SIGNIFICANT CHANGE UP (ref 80–100)
MONOCYTES # BLD AUTO: 0.58 K/UL — SIGNIFICANT CHANGE UP (ref 0–0.9)
MONOCYTES NFR BLD AUTO: 6.7 % — SIGNIFICANT CHANGE UP (ref 2–14)
NEUTROPHILS # BLD AUTO: 4.79 K/UL — SIGNIFICANT CHANGE UP (ref 1.8–7.4)
NEUTROPHILS NFR BLD AUTO: 55.5 % — SIGNIFICANT CHANGE UP (ref 43–77)
NITRITE UR-MCNC: NEGATIVE — SIGNIFICANT CHANGE UP
PH UR: 6.5 — SIGNIFICANT CHANGE UP (ref 5–8)
PLATELET # BLD AUTO: 215 K/UL — SIGNIFICANT CHANGE UP (ref 150–400)
POTASSIUM SERPL-MCNC: 3.9 MMOL/L — SIGNIFICANT CHANGE UP (ref 3.5–5.3)
POTASSIUM SERPL-SCNC: 3.9 MMOL/L — SIGNIFICANT CHANGE UP (ref 3.5–5.3)
PROT SERPL-MCNC: 7.2 GM/DL — SIGNIFICANT CHANGE UP (ref 6–8.3)
PROT UR-MCNC: NEGATIVE MG/DL — SIGNIFICANT CHANGE UP
RBC # BLD: 4.23 M/UL — SIGNIFICANT CHANGE UP (ref 3.8–5.2)
RBC # FLD: 12.3 % — SIGNIFICANT CHANGE UP (ref 10.3–14.5)
SODIUM SERPL-SCNC: 139 MMOL/L — SIGNIFICANT CHANGE UP (ref 135–145)
SP GR SPEC: 1.01 — SIGNIFICANT CHANGE UP (ref 1.01–1.02)
UROBILINOGEN FLD QL: NEGATIVE MG/DL — SIGNIFICANT CHANGE UP
WBC # BLD: 8.65 K/UL — SIGNIFICANT CHANGE UP (ref 3.8–10.5)
WBC # FLD AUTO: 8.65 K/UL — SIGNIFICANT CHANGE UP (ref 3.8–10.5)

## 2020-09-13 PROCEDURE — 74176 CT ABD & PELVIS W/O CONTRAST: CPT

## 2020-09-13 PROCEDURE — 85025 COMPLETE CBC W/AUTO DIFF WBC: CPT

## 2020-09-13 PROCEDURE — 80053 COMPREHEN METABOLIC PANEL: CPT

## 2020-09-13 PROCEDURE — 99284 EMERGENCY DEPT VISIT MOD MDM: CPT

## 2020-09-13 PROCEDURE — 36415 COLL VENOUS BLD VENIPUNCTURE: CPT

## 2020-09-13 PROCEDURE — 70450 CT HEAD/BRAIN W/O DYE: CPT | Mod: 26

## 2020-09-13 PROCEDURE — 99284 EMERGENCY DEPT VISIT MOD MDM: CPT | Mod: 25

## 2020-09-13 PROCEDURE — 87086 URINE CULTURE/COLONY COUNT: CPT

## 2020-09-13 PROCEDURE — 74176 CT ABD & PELVIS W/O CONTRAST: CPT | Mod: 26

## 2020-09-13 PROCEDURE — 96374 THER/PROPH/DIAG INJ IV PUSH: CPT

## 2020-09-13 PROCEDURE — 81025 URINE PREGNANCY TEST: CPT

## 2020-09-13 PROCEDURE — 81001 URINALYSIS AUTO W/SCOPE: CPT

## 2020-09-13 PROCEDURE — 70450 CT HEAD/BRAIN W/O DYE: CPT

## 2020-09-13 PROCEDURE — 96375 TX/PRO/DX INJ NEW DRUG ADDON: CPT

## 2020-09-13 RX ORDER — MECLIZINE HCL 12.5 MG
1 TABLET ORAL
Qty: 10 | Refills: 0
Start: 2020-09-13

## 2020-09-13 RX ORDER — MECLIZINE HCL 12.5 MG
25 TABLET ORAL ONCE
Refills: 0 | Status: COMPLETED | OUTPATIENT
Start: 2020-09-13 | End: 2020-09-13

## 2020-09-13 RX ORDER — ONDANSETRON 8 MG/1
4 TABLET, FILM COATED ORAL ONCE
Refills: 0 | Status: COMPLETED | OUTPATIENT
Start: 2020-09-13 | End: 2020-09-13

## 2020-09-13 RX ORDER — SODIUM CHLORIDE 9 MG/ML
1000 INJECTION INTRAMUSCULAR; INTRAVENOUS; SUBCUTANEOUS ONCE
Refills: 0 | Status: COMPLETED | OUTPATIENT
Start: 2020-09-13 | End: 2020-09-13

## 2020-09-13 RX ORDER — KETOROLAC TROMETHAMINE 30 MG/ML
30 SYRINGE (ML) INJECTION ONCE
Refills: 0 | Status: DISCONTINUED | OUTPATIENT
Start: 2020-09-13 | End: 2020-09-13

## 2020-09-13 RX ADMIN — ONDANSETRON 4 MILLIGRAM(S): 8 TABLET, FILM COATED ORAL at 05:39

## 2020-09-13 RX ADMIN — Medication 30 MILLIGRAM(S): at 05:39

## 2020-09-13 RX ADMIN — Medication 25 MILLIGRAM(S): at 06:55

## 2020-09-13 RX ADMIN — SODIUM CHLORIDE 1000 MILLILITER(S): 9 INJECTION INTRAMUSCULAR; INTRAVENOUS; SUBCUTANEOUS at 04:49

## 2020-09-13 NOTE — ED ADULT TRIAGE NOTE - CHIEF COMPLAINT QUOTE
patient states since 2am she has been having left side ABD pain.  patient also report hx of migraines with headache, blurred vision and dizziness for 2 days.

## 2020-09-13 NOTE — ED PROVIDER NOTE - OBJECTIVE STATEMENT
int [id number]    41 yo female with h/o hypothyroid presents to ED with multiple complaints. Pt c/o left sided abdominal pain that radiates to the left lower back, nausea without vomiting, dizziness, headache.  Pain feels so bad it makes her vision feel blurred at times.  No fever. No cough. No diarrhea. Took nothing for the pain. Past surgical hx cholecystectomy.  Nonsmoker, nondrinker.  PMD Dr Santiago.

## 2020-09-13 NOTE — ED PROVIDER NOTE - PATIENT PORTAL LINK FT
You can access the FollowMyHealth Patient Portal offered by [facility] by registering at the following website: [email or web address]. By joining [employer]’s FollowMyHealth portal, you will also be able to view your health information using other applications (apps) compatible with our system.

## 2020-09-13 NOTE — ED PROVIDER NOTE - NSFOLLOWUPINSTRUCTIONS_ED_ALL_ED_FT
Follow up with your PMD Monday  Meclizine for dizziness  Return to ED for any further concerns    Dizziness    Dizziness can manifest as a feeling of unsteadiness or light-headedness. You may feel like you are about to faint. This condition can be caused by a number of things, including medicines, dehydration, or illness. Drink enough fluid to keep your urine clear or pale yellow. Do not drink alcohol and limit your caffeine intake. Avoid quick or sudden movements.  Rise slowly from chairs and steady yourself until you feel okay. In the morning, first sit up on the side of the bed.    SEEK IMMEDIATE MEDICAL CARE IF YOU HAVE ANY OF THE FOLLOWING SYMPTOMS: vomiting, changes in your vision or speech, weakness in your arms or legs, trouble speaking or swallowing, chest pain, abdominal pain, shortness of breath, sweating, bleeding, headache, neck pain, or fever.

## 2020-09-14 LAB
CULTURE RESULTS: SIGNIFICANT CHANGE UP
SPECIMEN SOURCE: SIGNIFICANT CHANGE UP

## 2020-10-02 ENCOUNTER — APPOINTMENT (OUTPATIENT)
Dept: MAMMOGRAPHY | Facility: CLINIC | Age: 40
End: 2020-10-02
Payer: COMMERCIAL

## 2020-10-02 ENCOUNTER — OUTPATIENT (OUTPATIENT)
Dept: OUTPATIENT SERVICES | Facility: HOSPITAL | Age: 40
LOS: 1 days | End: 2020-10-02
Payer: COMMERCIAL

## 2020-10-02 ENCOUNTER — APPOINTMENT (OUTPATIENT)
Dept: ULTRASOUND IMAGING | Facility: CLINIC | Age: 40
End: 2020-10-02
Payer: COMMERCIAL

## 2020-10-02 DIAGNOSIS — Z00.8 ENCOUNTER FOR OTHER GENERAL EXAMINATION: ICD-10-CM

## 2020-10-02 DIAGNOSIS — Z90.49 ACQUIRED ABSENCE OF OTHER SPECIFIED PARTS OF DIGESTIVE TRACT: Chronic | ICD-10-CM

## 2020-10-02 PROCEDURE — 77063 BREAST TOMOSYNTHESIS BI: CPT

## 2020-10-02 PROCEDURE — 77063 BREAST TOMOSYNTHESIS BI: CPT | Mod: 26

## 2020-10-02 PROCEDURE — 77067 SCR MAMMO BI INCL CAD: CPT | Mod: 26

## 2020-10-02 PROCEDURE — 77067 SCR MAMMO BI INCL CAD: CPT

## 2020-12-04 ENCOUNTER — APPOINTMENT (OUTPATIENT)
Dept: GASTROENTEROLOGY | Facility: AMBULATORY MEDICAL SERVICES | Age: 40
End: 2020-12-04
Payer: SELF-PAY

## 2020-12-04 ENCOUNTER — RESULT REVIEW (OUTPATIENT)
Age: 40
End: 2020-12-04

## 2020-12-04 PROCEDURE — 45380 COLONOSCOPY AND BIOPSY: CPT

## 2020-12-21 PROBLEM — H66.90 EAR INFECTION: Status: RESOLVED | Noted: 2019-05-06 | Resolved: 2020-12-21

## 2021-02-08 ENCOUNTER — OUTPATIENT (OUTPATIENT)
Dept: OUTPATIENT SERVICES | Facility: HOSPITAL | Age: 41
LOS: 1 days | End: 2021-02-08
Payer: COMMERCIAL

## 2021-02-08 ENCOUNTER — APPOINTMENT (OUTPATIENT)
Dept: CT IMAGING | Facility: CLINIC | Age: 41
End: 2021-02-08
Payer: COMMERCIAL

## 2021-02-08 ENCOUNTER — RESULT REVIEW (OUTPATIENT)
Age: 41
End: 2021-02-08

## 2021-02-08 DIAGNOSIS — Z90.49 ACQUIRED ABSENCE OF OTHER SPECIFIED PARTS OF DIGESTIVE TRACT: Chronic | ICD-10-CM

## 2021-02-08 DIAGNOSIS — Z00.8 ENCOUNTER FOR OTHER GENERAL EXAMINATION: ICD-10-CM

## 2021-02-08 PROCEDURE — 74176 CT ABD & PELVIS W/O CONTRAST: CPT | Mod: 26

## 2021-02-08 PROCEDURE — 74176 CT ABD & PELVIS W/O CONTRAST: CPT

## 2021-03-08 ENCOUNTER — OUTPATIENT (OUTPATIENT)
Dept: OUTPATIENT SERVICES | Facility: HOSPITAL | Age: 41
LOS: 1 days | End: 2021-03-08
Payer: COMMERCIAL

## 2021-03-08 ENCOUNTER — RESULT REVIEW (OUTPATIENT)
Age: 41
End: 2021-03-08

## 2021-03-08 ENCOUNTER — APPOINTMENT (OUTPATIENT)
Dept: CT IMAGING | Facility: CLINIC | Age: 41
End: 2021-03-08
Payer: COMMERCIAL

## 2021-03-08 DIAGNOSIS — Z90.49 ACQUIRED ABSENCE OF OTHER SPECIFIED PARTS OF DIGESTIVE TRACT: Chronic | ICD-10-CM

## 2021-03-08 DIAGNOSIS — R10.32 LEFT LOWER QUADRANT PAIN: ICD-10-CM

## 2021-03-08 PROCEDURE — 74160 CT ABDOMEN W/CONTRAST: CPT | Mod: 26

## 2021-03-08 PROCEDURE — 74160 CT ABDOMEN W/CONTRAST: CPT

## 2021-05-20 ENCOUNTER — APPOINTMENT (OUTPATIENT)
Age: 41
End: 2021-05-20

## 2021-06-18 ENCOUNTER — APPOINTMENT (OUTPATIENT)
Age: 41
End: 2021-06-18

## 2021-06-21 ENCOUNTER — APPOINTMENT (OUTPATIENT)
Dept: ULTRASOUND IMAGING | Facility: CLINIC | Age: 41
End: 2021-06-21
Payer: COMMERCIAL

## 2021-06-21 ENCOUNTER — OUTPATIENT (OUTPATIENT)
Dept: OUTPATIENT SERVICES | Facility: HOSPITAL | Age: 41
LOS: 1 days | End: 2021-06-21
Payer: COMMERCIAL

## 2021-06-21 DIAGNOSIS — Z90.49 ACQUIRED ABSENCE OF OTHER SPECIFIED PARTS OF DIGESTIVE TRACT: Chronic | ICD-10-CM

## 2021-06-21 DIAGNOSIS — Z01.411 ENCOUNTER FOR GYNECOLOGICAL EXAMINATION (GENERAL) (ROUTINE) WITH ABNORMAL FINDINGS: ICD-10-CM

## 2021-06-21 PROCEDURE — 76830 TRANSVAGINAL US NON-OB: CPT

## 2021-06-21 PROCEDURE — 76856 US EXAM PELVIC COMPLETE: CPT

## 2021-06-21 PROCEDURE — 76856 US EXAM PELVIC COMPLETE: CPT | Mod: 26

## 2021-06-21 PROCEDURE — 76830 TRANSVAGINAL US NON-OB: CPT | Mod: 26

## 2021-06-21 NOTE — ED ADULT NURSE NOTE - CAS DISCH CONDITION
Letter by Myhre, David J, RN at      Author: Myhre, David J, RN Service: -- Author Type: --    Filed:  Encounter Date: 10/9/2020 Status: (Other)       Care Plan  About Me:    Patient Name:  Deloris Larson    YOB: 1932  Age:         88 y.o.   [facility] MRN:    [id number] Telephone Information:  Home Phone [phone]   Mobile [phone]       Address:  [address] [place] [address] Email address:  No e-mail address on record      Emergency Contact(s)  Extended Emergency Contact Information      Name: Ginna Ya    Relation: Child      Name: Bonnie Lo    Relation: Child          Primary language:  English     needed? No   [facility]:  [phone] op. 1  Other communication barriers: None  Preferred Method of Communication:  Mail  Current living arrangement: I live in a private home with family  Mobility Status/ Medical Equipment: Independent w/Device    Health Maintenance  Health Maintenance Reviewed: Up to date    My Access Plan  Medical Emergency 911   Primary Clinic Line Kassidy Salas MD - [phone]   24 Hour Appointment Line [phone] or  [phone]) (toll-free)   24 Hour Nurse Line [phone] (toll-free)   Preferred Urgent Care [facility], [phone]   Preferred Hospital [facility]  [phone]   Preferred Pharmacy [place], [place] - [address] [place]     Behavioral Health Crisis Line The National Suicide Prevention Lifeline at 1-209.371.1268 or 911             My Care Team Members  Patient Care Team       Relationship Specialty Notifications Start End    Kassidy Salas MD PCP - General Internal Medicine  8/25/20     Phone: [phone] Fax: [phone]         [address] [address]    Kassidy Salas MD Assigned PCP   9/7/20     Phone: [phone] Fax: [phone]         [address] [address]     Myhre, David J, RN Clinic Care Coordinator Primary Care -  Admissions 10/14/20     Fax: [phone]         Magda Mccormick  Lead Care Coordinator Primary Care - CC Admissions 10/14/20     Phone: [phone]         Beatris Jones CHW Community Health Worker Primary Care - CC Admissions 10/14/20     Phone: [phone] Fax: [phone]                My Care Plans  Self Management and Treatment Plan  Goals and (Comments)  Goals        General    Functional (pt-stated)     Notes - Note edited  10/14/2020  2:46 PM by Myhre, David J, RN    Goal Statement: I would like to have housekeeping services to assist me with maintaining my home and services to assist me with maintaining my yard in the next 3 months. I would also like to know if I am eligible for any programs that may assist with the cost of these services.   Date Goal set: 10/9/20  Barriers: Patient unable to keep up with her housekeeping related to physical changes.   Strengths: Strong advocate for herself. Supportive family.   Date to Achieve By: 1/9/20  Patient expressed understanding of goal: Yes  Action steps to achieve this goal:  1. I speak with the [facility] LSWMagda at scheduled phone visit on 10/16/20 to discuss resources and possible programs that may help pay for these services.   2. I will provide any necessary documentation, complete any assessments and complete any paperwork that may be associated with this goal.   3. I will report progress toward this goal at scheduled outreach telephone calls  from the [facility] Community Health WorkerBeatris.         Healthy Eating (pt-stated)     Notes - Note edited  10/14/2020  2:55 PM by Myhre, David J, RN    Goal Statement: I would like to have meals delivered to my home in the next 2 months.   Date Goal set: 10/9/20  Barriers: Diagnosis of malignant neoplasm of the colon  Strengths: Strong advocate for herself. Supportive family.  Date to Achieve By: 12/9/20  Patient expressed understanding of goal:  Yes  Action steps to achieve this goal:  1. I will speak with the [facility] LSWMagda at scheduled phone visit on 10/16/20 to discuss in-home meal delivery programs.   2. I will provide any necessary documentation and complete any paperwork that may be associated with this goal in a timely manner.   3. I will report progress towards this goal at scheduled outreach telephone calls from the [facility] CHW.                 Advance Care Plans/Directives Type:   Type Advanced Care Plans/Directives: Advanced Directive - On File    My Medical and Care Information  Problem List   Patient Active Problem List   Diagnosis   ? Mixed hyperlipidemia   ? Essential hypertension   ? Insomnia   ? Coronary Artery Disease   ? Intermittent claudication (H)   ? Vitamin D deficiency   ? RLS (restless legs syndrome)   ? Persistent atrial fibrillation   ? Cardiac pacemaker in situ, dual chamber   ? Nonrheumatic aortic valve stenosis   ? Malignant neoplasm of colon, unspecified part of colon (H)      Current Medications and Allergies:  See printed Medication Report.    Care Coordination Start Date: 10/9/2020   Frequency of Care Coordination:     Form Last Updated: 10/14/2020               Improved

## 2021-07-08 NOTE — ED ADULT NURSE NOTE - LATERALITY
----- Message from Mariano Diane MD sent at 7/8/2021  8:49 AM CDT -----  Please notify the patient of stable echo results.  Follow-up as planned. Thanks   left

## 2021-08-09 ENCOUNTER — OUTPATIENT (OUTPATIENT)
Dept: OUTPATIENT SERVICES | Facility: HOSPITAL | Age: 41
LOS: 1 days | End: 2021-08-09
Payer: COMMERCIAL

## 2021-08-09 ENCOUNTER — APPOINTMENT (OUTPATIENT)
Dept: ULTRASOUND IMAGING | Facility: CLINIC | Age: 41
End: 2021-08-09
Payer: COMMERCIAL

## 2021-08-09 DIAGNOSIS — Z90.49 ACQUIRED ABSENCE OF OTHER SPECIFIED PARTS OF DIGESTIVE TRACT: Chronic | ICD-10-CM

## 2021-08-09 DIAGNOSIS — N83.209 UNSPECIFIED OVARIAN CYST, UNSPECIFIED SIDE: ICD-10-CM

## 2021-08-09 PROCEDURE — 76830 TRANSVAGINAL US NON-OB: CPT | Mod: 26

## 2021-08-09 PROCEDURE — 76830 TRANSVAGINAL US NON-OB: CPT

## 2021-08-09 PROCEDURE — 76856 US EXAM PELVIC COMPLETE: CPT | Mod: 26

## 2021-08-09 PROCEDURE — 76856 US EXAM PELVIC COMPLETE: CPT

## 2021-10-04 ENCOUNTER — APPOINTMENT (OUTPATIENT)
Dept: MAMMOGRAPHY | Facility: CLINIC | Age: 41
End: 2021-10-04
Payer: COMMERCIAL

## 2021-10-04 ENCOUNTER — OUTPATIENT (OUTPATIENT)
Dept: OUTPATIENT SERVICES | Facility: HOSPITAL | Age: 41
LOS: 1 days | End: 2021-10-04
Payer: COMMERCIAL

## 2021-10-04 DIAGNOSIS — Z90.49 ACQUIRED ABSENCE OF OTHER SPECIFIED PARTS OF DIGESTIVE TRACT: Chronic | ICD-10-CM

## 2021-10-04 DIAGNOSIS — Z01.411 ENCOUNTER FOR GYNECOLOGICAL EXAMINATION (GENERAL) (ROUTINE) WITH ABNORMAL FINDINGS: ICD-10-CM

## 2021-10-04 PROCEDURE — 77067 SCR MAMMO BI INCL CAD: CPT | Mod: 26

## 2021-10-04 PROCEDURE — 77067 SCR MAMMO BI INCL CAD: CPT

## 2021-10-04 PROCEDURE — 77063 BREAST TOMOSYNTHESIS BI: CPT | Mod: 26

## 2021-10-04 PROCEDURE — 77063 BREAST TOMOSYNTHESIS BI: CPT

## 2021-10-25 ENCOUNTER — EMERGENCY (EMERGENCY)
Facility: HOSPITAL | Age: 41
LOS: 0 days | Discharge: HOME | End: 2021-10-25
Attending: EMERGENCY MEDICINE
Payer: MEDICAID

## 2021-10-25 VITALS
WEIGHT: 160.06 LBS | HEART RATE: 66 BPM | DIASTOLIC BLOOD PRESSURE: 86 MMHG | HEIGHT: 62 IN | RESPIRATION RATE: 18 BRPM | TEMPERATURE: 98 F | OXYGEN SATURATION: 97 % | SYSTOLIC BLOOD PRESSURE: 141 MMHG

## 2021-10-25 DIAGNOSIS — R10.9 UNSPECIFIED ABDOMINAL PAIN: ICD-10-CM

## 2021-10-25 DIAGNOSIS — N39.0 URINARY TRACT INFECTION, SITE NOT SPECIFIED: ICD-10-CM

## 2021-10-25 DIAGNOSIS — Z90.49 ACQUIRED ABSENCE OF OTHER SPECIFIED PARTS OF DIGESTIVE TRACT: Chronic | ICD-10-CM

## 2021-10-25 DIAGNOSIS — E03.9 HYPOTHYROIDISM, UNSPECIFIED: ICD-10-CM

## 2021-10-25 DIAGNOSIS — R68.83 CHILLS (WITHOUT FEVER): ICD-10-CM

## 2021-10-25 DIAGNOSIS — Z90.49 ACQUIRED ABSENCE OF OTHER SPECIFIED PARTS OF DIGESTIVE TRACT: ICD-10-CM

## 2021-10-25 LAB
ALBUMIN SERPL ELPH-MCNC: 4.3 G/DL — SIGNIFICANT CHANGE UP (ref 3.3–5)
ALP SERPL-CCNC: 88 U/L — SIGNIFICANT CHANGE UP (ref 40–120)
ALT FLD-CCNC: 27 U/L — SIGNIFICANT CHANGE UP (ref 12–78)
ANION GAP SERPL CALC-SCNC: 3 MMOL/L — LOW (ref 5–17)
APPEARANCE UR: CLEAR — SIGNIFICANT CHANGE UP
AST SERPL-CCNC: 16 U/L — SIGNIFICANT CHANGE UP (ref 15–37)
BACTERIA # UR AUTO: ABNORMAL
BASOPHILS # BLD AUTO: 0.07 K/UL — SIGNIFICANT CHANGE UP (ref 0–0.2)
BASOPHILS NFR BLD AUTO: 0.8 % — SIGNIFICANT CHANGE UP (ref 0–2)
BILIRUB SERPL-MCNC: 0.4 MG/DL — SIGNIFICANT CHANGE UP (ref 0.2–1.2)
BILIRUB UR-MCNC: NEGATIVE — SIGNIFICANT CHANGE UP
BUN SERPL-MCNC: 10 MG/DL — SIGNIFICANT CHANGE UP (ref 7–23)
CALCIUM SERPL-MCNC: 8.6 MG/DL — SIGNIFICANT CHANGE UP (ref 8.5–10.1)
CHLORIDE SERPL-SCNC: 107 MMOL/L — SIGNIFICANT CHANGE UP (ref 96–108)
CO2 SERPL-SCNC: 27 MMOL/L — SIGNIFICANT CHANGE UP (ref 22–31)
COLOR SPEC: YELLOW — SIGNIFICANT CHANGE UP
CREAT SERPL-MCNC: 0.65 MG/DL — SIGNIFICANT CHANGE UP (ref 0.5–1.3)
DIFF PNL FLD: ABNORMAL
EOSINOPHIL # BLD AUTO: 0.18 K/UL — SIGNIFICANT CHANGE UP (ref 0–0.5)
EOSINOPHIL NFR BLD AUTO: 2.1 % — SIGNIFICANT CHANGE UP (ref 0–6)
EPI CELLS # UR: SIGNIFICANT CHANGE UP
GLUCOSE SERPL-MCNC: 101 MG/DL — HIGH (ref 70–99)
GLUCOSE UR QL: NEGATIVE MG/DL — SIGNIFICANT CHANGE UP
HCT VFR BLD CALC: 39.6 % — SIGNIFICANT CHANGE UP (ref 34.5–45)
HGB BLD-MCNC: 13.4 G/DL — SIGNIFICANT CHANGE UP (ref 11.5–15.5)
IMM GRANULOCYTES NFR BLD AUTO: 0.5 % — SIGNIFICANT CHANGE UP (ref 0–1.5)
KETONES UR-MCNC: NEGATIVE — SIGNIFICANT CHANGE UP
LEUKOCYTE ESTERASE UR-ACNC: NEGATIVE — SIGNIFICANT CHANGE UP
LYMPHOCYTES # BLD AUTO: 3.72 K/UL — HIGH (ref 1–3.3)
LYMPHOCYTES # BLD AUTO: 43.4 % — SIGNIFICANT CHANGE UP (ref 13–44)
MCHC RBC-ENTMCNC: 30 PG — SIGNIFICANT CHANGE UP (ref 27–34)
MCHC RBC-ENTMCNC: 33.8 GM/DL — SIGNIFICANT CHANGE UP (ref 32–36)
MCV RBC AUTO: 88.6 FL — SIGNIFICANT CHANGE UP (ref 80–100)
MONOCYTES # BLD AUTO: 0.55 K/UL — SIGNIFICANT CHANGE UP (ref 0–0.9)
MONOCYTES NFR BLD AUTO: 6.4 % — SIGNIFICANT CHANGE UP (ref 2–14)
NEUTROPHILS # BLD AUTO: 4.01 K/UL — SIGNIFICANT CHANGE UP (ref 1.8–7.4)
NEUTROPHILS NFR BLD AUTO: 46.8 % — SIGNIFICANT CHANGE UP (ref 43–77)
NITRITE UR-MCNC: NEGATIVE — SIGNIFICANT CHANGE UP
PH UR: 7 — SIGNIFICANT CHANGE UP (ref 5–8)
PLATELET # BLD AUTO: 254 K/UL — SIGNIFICANT CHANGE UP (ref 150–400)
POTASSIUM SERPL-MCNC: 4 MMOL/L — SIGNIFICANT CHANGE UP (ref 3.5–5.3)
POTASSIUM SERPL-SCNC: 4 MMOL/L — SIGNIFICANT CHANGE UP (ref 3.5–5.3)
PROT SERPL-MCNC: 7.6 GM/DL — SIGNIFICANT CHANGE UP (ref 6–8.3)
PROT UR-MCNC: NEGATIVE MG/DL — SIGNIFICANT CHANGE UP
RBC # BLD: 4.47 M/UL — SIGNIFICANT CHANGE UP (ref 3.8–5.2)
RBC # FLD: 12.9 % — SIGNIFICANT CHANGE UP (ref 10.3–14.5)
RBC CASTS # UR COMP ASSIST: ABNORMAL /HPF (ref 0–4)
SODIUM SERPL-SCNC: 137 MMOL/L — SIGNIFICANT CHANGE UP (ref 135–145)
SP GR SPEC: 1 — LOW (ref 1.01–1.02)
UROBILINOGEN FLD QL: NEGATIVE MG/DL — SIGNIFICANT CHANGE UP
WBC # BLD: 8.57 K/UL — SIGNIFICANT CHANGE UP (ref 3.8–10.5)
WBC # FLD AUTO: 8.57 K/UL — SIGNIFICANT CHANGE UP (ref 3.8–10.5)
WBC UR QL: SIGNIFICANT CHANGE UP

## 2021-10-25 PROCEDURE — 74176 CT ABD & PELVIS W/O CONTRAST: CPT | Mod: 26,MA

## 2021-10-25 PROCEDURE — 87086 URINE CULTURE/COLONY COUNT: CPT

## 2021-10-25 PROCEDURE — 74176 CT ABD & PELVIS W/O CONTRAST: CPT | Mod: MA

## 2021-10-25 PROCEDURE — 81001 URINALYSIS AUTO W/SCOPE: CPT

## 2021-10-25 PROCEDURE — 99284 EMERGENCY DEPT VISIT MOD MDM: CPT | Mod: 25

## 2021-10-25 PROCEDURE — 96374 THER/PROPH/DIAG INJ IV PUSH: CPT

## 2021-10-25 PROCEDURE — 81025 URINE PREGNANCY TEST: CPT

## 2021-10-25 PROCEDURE — 36415 COLL VENOUS BLD VENIPUNCTURE: CPT

## 2021-10-25 PROCEDURE — 80053 COMPREHEN METABOLIC PANEL: CPT

## 2021-10-25 PROCEDURE — 99285 EMERGENCY DEPT VISIT HI MDM: CPT

## 2021-10-25 PROCEDURE — 85025 COMPLETE CBC W/AUTO DIFF WBC: CPT

## 2021-10-25 RX ORDER — CEPHALEXIN 500 MG
500 CAPSULE ORAL ONCE
Refills: 0 | Status: COMPLETED | OUTPATIENT
Start: 2021-10-25 | End: 2021-10-25

## 2021-10-25 RX ORDER — KETOROLAC TROMETHAMINE 30 MG/ML
15 SYRINGE (ML) INJECTION ONCE
Refills: 0 | Status: DISCONTINUED | OUTPATIENT
Start: 2021-10-25 | End: 2021-10-25

## 2021-10-25 RX ORDER — SODIUM CHLORIDE 9 MG/ML
1000 INJECTION INTRAMUSCULAR; INTRAVENOUS; SUBCUTANEOUS ONCE
Refills: 0 | Status: COMPLETED | OUTPATIENT
Start: 2021-10-25 | End: 2021-10-25

## 2021-10-25 RX ORDER — CEPHALEXIN 500 MG
1 CAPSULE ORAL
Qty: 21 | Refills: 0
Start: 2021-10-25

## 2021-10-25 RX ADMIN — Medication 15 MILLIGRAM(S): at 22:27

## 2021-10-25 RX ADMIN — Medication 500 MILLIGRAM(S): at 23:05

## 2021-10-25 RX ADMIN — SODIUM CHLORIDE 1000 MILLILITER(S): 9 INJECTION INTRAMUSCULAR; INTRAVENOUS; SUBCUTANEOUS at 22:28

## 2021-10-25 NOTE — ED ADULT NURSE NOTE - TEMPLATE
Cerumen Removal Procedure  Patient gave verbal consent. Risks and Benefits of removal were discussed with the patient. he agreed to proceed with procedure. Location:  bilateral ear   Indication:  TM not visible.   Patient states he went to get fitted Abdominal Pain, N/V/D

## 2021-10-25 NOTE — ED STATDOCS - NS_ATTENDINGSCRIBE_ED_ALL_ED
General
I personally performed the service described in the documentation recorded by the scribe in my presence, and it accurately and completely records my words and actions.

## 2021-10-25 NOTE — ED STATDOCS - PHYSICAL EXAMINATION
PA NOTE: GEN: AOX3, NAD. HEENT: Throat clear. Airway is patent. EYES: PERRLA. EOMI. Head: NC/AT. NECK: Supple, No JVD. FROM. C-spine non-tender. CV:S1S2, RRR, LUNGS: Non-labored breathing, no tachypnea. O2sat 100% RA. CTA b/l. No w/r/r. CHEST: Equal chest expansion and rise. No deformity. ABD: Soft, +Mild tenderness left flank and suprapubic area. NO CVAT. No rebound, no guarding. EXT: No e/c/c. 2+ distal pulses. SKIN: No rashes. NEURO: No focal deficits. CN II-XII intact. FROM. 5/5 motor and sensory. ~Wayne Lentz PA-C

## 2021-10-25 NOTE — ED STATDOCS - NSFOLLOWUPCLINICS_GEN_ALL_ED_FT
[facility]  Family Medicine  [address] [place] [address]  Phone: [phone]  Fax:   Follow Up Time: 1-3 Days

## 2021-10-25 NOTE — ED ADULT TRIAGE NOTE - CHIEF COMPLAINT QUOTE
complains of bilateral flank pain radiating to lower abdomen, first noticed 15 days ago, worsening over past day.

## 2021-10-25 NOTE — ED STATDOCS - CARE PLAN
1 Principal Discharge DX:	Lt flank pain   Principal Discharge DX:	Acute UTI  Secondary Diagnosis:	Flank pain

## 2021-10-25 NOTE — ED STATDOCS - CLINICAL SUMMARY MEDICAL DECISION MAKING FREE TEXT BOX
eval for quirino vs kidney stone less likely divert, eval for pyelo vs kidney stone vs less likely diverticulitis,

## 2021-10-25 NOTE — ED STATDOCS - PROGRESS NOTE DETAILS
PA note: CT NEG. +Hematuria. All labwork and studies reviewed in details with patient. Patient re-examined and re-evaluated. Patient feels much better at this time. ED evaluation, Diagnosis and management discussed with the patient in detail. Workup results discussed with ED attending, OK to dc home. Close PMD follow up encouraged.  Strict ED return instructions discussed in detail and patient given the opportunity to ask any questions about their discharge diagnosis and instructions. Patient verbalized understanding. ~ Wayne Lentz PA-C PA: Patient is a 42 y/o female with no pertinent PMHx who presents to [facility] c/o intermittent L flank pain x 2 weeks, dysuria x 2 days. No hx of kidney stones. DENIES n/v/d, fever or chills. ~Wayne Lentz PA-C

## 2021-10-25 NOTE — ED STATDOCS - ATTENDING CONTRIBUTION TO CARE
[Follow-up Visit ___] : a follow-up visit  for [unfilled] I provided initial assessment of patient and had discussion of plan with ACP and was available for further consultation throughout ed stay.

## 2021-10-25 NOTE — ED STATDOCS - PATIENT PORTAL LINK FT
You can access the FollowMyHealth Patient Portal offered by [facility] by registering at the following website: [email or web address]. By joining [employer]’s FollowMyHealth portal, you will also be able to view your health information using other applications (apps) compatible with our system.

## 2021-10-26 ENCOUNTER — EMERGENCY (EMERGENCY)
Facility: HOSPITAL | Age: 41
LOS: 0 days | Discharge: HOME | End: 2021-10-27
Attending: EMERGENCY MEDICINE
Payer: MEDICAID

## 2021-10-26 VITALS — HEIGHT: 62 IN | WEIGHT: 139.99 LBS

## 2021-10-26 DIAGNOSIS — E03.9 HYPOTHYROIDISM, UNSPECIFIED: ICD-10-CM

## 2021-10-26 DIAGNOSIS — Z90.49 ACQUIRED ABSENCE OF OTHER SPECIFIED PARTS OF DIGESTIVE TRACT: ICD-10-CM

## 2021-10-26 DIAGNOSIS — N83.202 UNSPECIFIED OVARIAN CYST, LEFT SIDE: ICD-10-CM

## 2021-10-26 DIAGNOSIS — R10.9 UNSPECIFIED ABDOMINAL PAIN: ICD-10-CM

## 2021-10-26 DIAGNOSIS — Z90.49 ACQUIRED ABSENCE OF OTHER SPECIFIED PARTS OF DIGESTIVE TRACT: Chronic | ICD-10-CM

## 2021-10-26 LAB
APPEARANCE UR: CLEAR — SIGNIFICANT CHANGE UP
BASOPHILS # BLD AUTO: 0.08 K/UL — SIGNIFICANT CHANGE UP (ref 0–0.2)
BASOPHILS NFR BLD AUTO: 0.9 % — SIGNIFICANT CHANGE UP (ref 0–2)
BILIRUB UR-MCNC: NEGATIVE — SIGNIFICANT CHANGE UP
COLOR SPEC: YELLOW — SIGNIFICANT CHANGE UP
DIFF PNL FLD: ABNORMAL
EOSINOPHIL # BLD AUTO: 0.22 K/UL — SIGNIFICANT CHANGE UP (ref 0–0.5)
EOSINOPHIL NFR BLD AUTO: 2.5 % — SIGNIFICANT CHANGE UP (ref 0–6)
GLUCOSE UR QL: NEGATIVE MG/DL — SIGNIFICANT CHANGE UP
HCT VFR BLD CALC: 37.4 % — SIGNIFICANT CHANGE UP (ref 34.5–45)
HGB BLD-MCNC: 12.9 G/DL — SIGNIFICANT CHANGE UP (ref 11.5–15.5)
IMM GRANULOCYTES NFR BLD AUTO: 0.3 % — SIGNIFICANT CHANGE UP (ref 0–1.5)
KETONES UR-MCNC: NEGATIVE — SIGNIFICANT CHANGE UP
LEUKOCYTE ESTERASE UR-ACNC: NEGATIVE — SIGNIFICANT CHANGE UP
LYMPHOCYTES # BLD AUTO: 3.61 K/UL — HIGH (ref 1–3.3)
LYMPHOCYTES # BLD AUTO: 41.4 % — SIGNIFICANT CHANGE UP (ref 13–44)
MCHC RBC-ENTMCNC: 30.4 PG — SIGNIFICANT CHANGE UP (ref 27–34)
MCHC RBC-ENTMCNC: 34.5 GM/DL — SIGNIFICANT CHANGE UP (ref 32–36)
MCV RBC AUTO: 88.2 FL — SIGNIFICANT CHANGE UP (ref 80–100)
MONOCYTES # BLD AUTO: 0.61 K/UL — SIGNIFICANT CHANGE UP (ref 0–0.9)
MONOCYTES NFR BLD AUTO: 7 % — SIGNIFICANT CHANGE UP (ref 2–14)
NEUTROPHILS # BLD AUTO: 4.16 K/UL — SIGNIFICANT CHANGE UP (ref 1.8–7.4)
NEUTROPHILS NFR BLD AUTO: 47.9 % — SIGNIFICANT CHANGE UP (ref 43–77)
NITRITE UR-MCNC: NEGATIVE — SIGNIFICANT CHANGE UP
PH UR: 6.5 — SIGNIFICANT CHANGE UP (ref 5–8)
PLATELET # BLD AUTO: 254 K/UL — SIGNIFICANT CHANGE UP (ref 150–400)
PROT UR-MCNC: NEGATIVE MG/DL — SIGNIFICANT CHANGE UP
RBC # BLD: 4.24 M/UL — SIGNIFICANT CHANGE UP (ref 3.8–5.2)
RBC # FLD: 13.1 % — SIGNIFICANT CHANGE UP (ref 10.3–14.5)
SP GR SPEC: 1 — LOW (ref 1.01–1.02)
UROBILINOGEN FLD QL: NEGATIVE MG/DL — SIGNIFICANT CHANGE UP
WBC # BLD: 8.71 K/UL — SIGNIFICANT CHANGE UP (ref 3.8–10.5)
WBC # FLD AUTO: 8.71 K/UL — SIGNIFICANT CHANGE UP (ref 3.8–10.5)

## 2021-10-26 PROCEDURE — 96375 TX/PRO/DX INJ NEW DRUG ADDON: CPT

## 2021-10-26 PROCEDURE — 85025 COMPLETE CBC W/AUTO DIFF WBC: CPT

## 2021-10-26 PROCEDURE — 99284 EMERGENCY DEPT VISIT MOD MDM: CPT | Mod: 25

## 2021-10-26 PROCEDURE — 93975 VASCULAR STUDY: CPT

## 2021-10-26 PROCEDURE — 81001 URINALYSIS AUTO W/SCOPE: CPT

## 2021-10-26 PROCEDURE — 74177 CT ABD & PELVIS W/CONTRAST: CPT | Mod: MA

## 2021-10-26 PROCEDURE — 36415 COLL VENOUS BLD VENIPUNCTURE: CPT

## 2021-10-26 PROCEDURE — 83690 ASSAY OF LIPASE: CPT

## 2021-10-26 PROCEDURE — 80048 BASIC METABOLIC PNL TOTAL CA: CPT

## 2021-10-26 PROCEDURE — 81025 URINE PREGNANCY TEST: CPT

## 2021-10-26 PROCEDURE — 76830 TRANSVAGINAL US NON-OB: CPT

## 2021-10-26 PROCEDURE — 99285 EMERGENCY DEPT VISIT HI MDM: CPT

## 2021-10-26 PROCEDURE — 96374 THER/PROPH/DIAG INJ IV PUSH: CPT | Mod: XU

## 2021-10-26 RX ORDER — SODIUM CHLORIDE 9 MG/ML
1000 INJECTION INTRAMUSCULAR; INTRAVENOUS; SUBCUTANEOUS ONCE
Refills: 0 | Status: COMPLETED | OUTPATIENT
Start: 2021-10-26 | End: 2021-10-26

## 2021-10-26 RX ORDER — KETOROLAC TROMETHAMINE 30 MG/ML
30 SYRINGE (ML) INJECTION ONCE
Refills: 0 | Status: DISCONTINUED | OUTPATIENT
Start: 2021-10-26 | End: 2021-10-26

## 2021-10-26 RX ADMIN — SODIUM CHLORIDE 1000 MILLILITER(S): 9 INJECTION INTRAMUSCULAR; INTRAVENOUS; SUBCUTANEOUS at 23:58

## 2021-10-26 RX ADMIN — Medication 30 MILLIGRAM(S): at 23:58

## 2021-10-26 NOTE — ED ADULT TRIAGE NOTE - CHIEF COMPLAINT QUOTE
Pt states she was seen here last night for same issues.  C/O left sided abdominal pain and burning in stomach.  States she feels like something is going to "burst".  : [id number]

## 2021-10-27 VITALS
SYSTOLIC BLOOD PRESSURE: 123 MMHG | HEART RATE: 64 BPM | TEMPERATURE: 98 F | DIASTOLIC BLOOD PRESSURE: 86 MMHG | OXYGEN SATURATION: 100 % | RESPIRATION RATE: 16 BRPM

## 2021-10-27 LAB
ANION GAP SERPL CALC-SCNC: 4 MMOL/L — LOW (ref 5–17)
BUN SERPL-MCNC: 8 MG/DL — SIGNIFICANT CHANGE UP (ref 7–23)
CALCIUM SERPL-MCNC: 8.5 MG/DL — SIGNIFICANT CHANGE UP (ref 8.5–10.1)
CHLORIDE SERPL-SCNC: 107 MMOL/L — SIGNIFICANT CHANGE UP (ref 96–108)
CO2 SERPL-SCNC: 26 MMOL/L — SIGNIFICANT CHANGE UP (ref 22–31)
CREAT SERPL-MCNC: 0.59 MG/DL — SIGNIFICANT CHANGE UP (ref 0.5–1.3)
CULTURE RESULTS: SIGNIFICANT CHANGE UP
GLUCOSE SERPL-MCNC: 101 MG/DL — HIGH (ref 70–99)
LIDOCAIN IGE QN: 56 U/L — LOW (ref 73–393)
POTASSIUM SERPL-MCNC: 3.7 MMOL/L — SIGNIFICANT CHANGE UP (ref 3.5–5.3)
POTASSIUM SERPL-SCNC: 3.7 MMOL/L — SIGNIFICANT CHANGE UP (ref 3.5–5.3)
SODIUM SERPL-SCNC: 137 MMOL/L — SIGNIFICANT CHANGE UP (ref 135–145)
SPECIMEN SOURCE: SIGNIFICANT CHANGE UP

## 2021-10-27 PROCEDURE — 76830 TRANSVAGINAL US NON-OB: CPT | Mod: 26

## 2021-10-27 PROCEDURE — 93975 VASCULAR STUDY: CPT | Mod: 26

## 2021-10-27 PROCEDURE — 74177 CT ABD & PELVIS W/CONTRAST: CPT | Mod: 26,MA

## 2021-10-27 RX ORDER — MORPHINE SULFATE 50 MG/1
4 CAPSULE, EXTENDED RELEASE ORAL ONCE
Refills: 0 | Status: DISCONTINUED | OUTPATIENT
Start: 2021-10-27 | End: 2021-10-27

## 2021-10-27 RX ORDER — ONDANSETRON 8 MG/1
4 TABLET, FILM COATED ORAL ONCE
Refills: 0 | Status: COMPLETED | OUTPATIENT
Start: 2021-10-27 | End: 2021-10-27

## 2021-10-27 RX ADMIN — MORPHINE SULFATE 4 MILLIGRAM(S): 50 CAPSULE, EXTENDED RELEASE ORAL at 02:02

## 2021-10-27 RX ADMIN — ONDANSETRON 4 MILLIGRAM(S): 8 TABLET, FILM COATED ORAL at 02:04

## 2021-10-27 NOTE — ED PROVIDER NOTE - PATIENT PORTAL LINK FT
You can access the FollowMyHealth Patient Portal offered by [facility] by registering at the following website: [email or web address]. By joining [employer]’s FollowMyHealth portal, you will also be able to view your health information using other applications (apps) compatible with our system.

## 2021-10-27 NOTE — ED PROVIDER NOTE - PROGRESS NOTE DETAILS
pt with continued left sided abdominal pain not improving with toradol and fluids, discussed  ct tonight, pt aware of radiation exposure and wants ct to check source of pain BORIS Spence DO to so am doctor us pelvis for left sided abdominal pain with left ovarian cyst BORIS Spence DO Karl MELGOZA:  Service: [id number]- received s/o from Dr. Spence- patient feeling better at this time; pain improved; patient with ovarian cyst; instructed to f/u with pmd, gyn in 1-2 days without fail; strict return precautions given. motrin/tylenol prn pain

## 2021-10-27 NOTE — ED PROVIDER NOTE - OBJECTIVE STATEMENT
42 yo female pw left sided abdominal pain that started tonight. Pt was seen here on 10/25 and dx with uti, had ctap without contrast to r/o obstructive uropathy which was negative. Tonight pt developed pain, took advil without improvement. No nausea or vomiting, no fever, or chills, no c/o dysuria

## 2021-10-27 NOTE — ED ADULT NURSE NOTE - NSFALLRSKASSESSTYPE_ED_ALL_ED
----- Message from London Caceres MD sent at 9/23/2021 12:43 PM EDT -----  approved  ----- Message -----  From: Kathrin Castleman  Sent: 9/1/2021   8:37 AM EDT  To: Sleep Medicine [place] Provider    This sleep study needs approval      If approved please sign and return to clerical pool  If denied please include reasons why  Also provide alternative testing if warranted  Please sign and return to clerical pool 
Initial (On Arrival)

## 2021-10-27 NOTE — ED ADULT NURSE NOTE - OBJECTIVE STATEMENT
pt presents to the ED c/o L sided abdominal pain/flank pain x 2 weeks. Pt states she was seen here yesterday and dx with a uti. Pt denies fevers, chills N/V/D/cp/sob/burning with urination/dysuria. Safety maintained and call bell within reach.  ID [id number] used

## 2021-10-27 NOTE — ED PROVIDER NOTE - PHYSICAL EXAMINATION
Gen:  Well appearing in distress with left sided abdominal pain  Head:  NC/AT  HEENT: pupils perrl,no pharyngeal erythema, uvula midline  Cardiac: S1S2, RRR  Abd: Soft, ttp left middle abdomen  Resp: No distress, CTA   musculoskeletal:: no deformities, no swelling, strength +5/+5  Skin: warm and dry as visualized, no rashes  Neuro: SEQUEIRA,  aao x 4  Psych:alert, cooperative, appropriate mood and affect for situation

## 2021-10-27 NOTE — ED PROVIDER NOTE - NSFOLLOWUPINSTRUCTIONS_ED_ALL_ED_FT
Quiste ovárico    LO QUE NECESITA SABER:    ¿Qué es un quiste ovárico?Un quiste ovárico es un saco lleno de líquido que crece dentro de un ovario o sobre irma. Usted tiene 2 ovarios, 1 a cada lado del útero. Son pequeños, del tamaño y la forma similares a janusz joan. Los quistes ováricos son comunes en las mujeres que tienen ciclos menstruales regulares. Kristi carrillo ciclo menstrual, los óvulos son liberados de los ovarios. El quiste suele contener líquido dary, en ocasiones, puede contener aren o tejido. La mayoría de los quistes ováricos no son de gran cuidado y desaparecen en varios meses sin necesidad de tratamiento. Sin embargo, algunos quistes pueden crecer grandes y causar dolor o romperse.     Sistema reproductor femenino         ¿Qué ocasiona los quistes ováricos?  •Problemas hormonales      •Medicamentos que le ayudan a ovular      •Endometriosis      •Embarazo      •Janusz infección grave en la pelvis      ¿Cuáles son los signos y síntomas de los quistes ováricos?Es posible que tenga presión, hinchazón o inflamación en la parte inferior del abdomen en el lado del quiste. También puede tener un dolor sordo o carline que puede ir y venir. Los siguientes son signos y síntomas menos comunes:   •Dolor sordo en la parte baja de la espalda y los muslos      •Sangrado vaginal inusual      •Aumento de peso que no esperaba ni planificaba      •Dolor kristi el ciclo menstrual      •Sensibilidad en las mamas      •Dificultad para vaciar completamente los intestinos o la vejiga      •Necesidad de orinar a menudo      •Dolor kristi las relaciones sexuales      ¿Cómo se diagnostican los quistes ováricos?  •Los análisis de sangrepueden mostrar qué tipo de quiste tiene y si necesita tratamiento.      •Un examen pélvicopuede ayudar a carrillo médico a sentir un quiste ovárico.      •Janusz ecografía pélvicapara oscar si tiene un quiste en el ovario. El transductor de la ecografía usa ondas sonoras para mostrar imágenes en un monitor. Irma transductor se inserta en la vagina y se guía hacia arriba, en dirección al útero.      ¿Cómo se tratan los quistes ováricos?El tratamiento dependerá de carrillo edad, los síntomas y el tipo de quiste que tiene. Es posible que usted necesite alguno de los siguientes:   •Conducta expectantepodría recomendarse. [place] significa que el quiste no se trata de inmediato. Deberá estar atenta a cualquier signo o síntoma que indique que el quiste está creciendo. Es posible que tenga que volver para janusz o más ecografías después de un cierto tiempo. Estas mostrarán si el quiste ha cambiado de tamaño.      •Medicamentos:?Píldoras anticonceptivaspodrían ayudar a controlar el ciclo menstrual, prevenir los quistes o hacer que los quistes se reduzcan de tamaño.      ?Acetaminofénalivia el dolor y baja la fiebre. Está disponible sin receta médica. Pregunte la cantidad y la frecuencia con que debe tomarlos. Siga las indicaciones. Janie las etiquetas de todos los demás medicamentos que esté usando para saber si también contienen acetaminofén, o pregunte a carrillo médico o farmacéutico. El acetaminofén puede causar daño en el hígado cuando no se gilberto de forma correcta. No use más de 4 gramos (4000 miligramos) en total de acetaminofeno en un día.      ?Los LUPILLO,seth el ibuprofeno, ayudan a disminuir la inflamación, el dolor y la fiebre. Irma medicamento está disponible con o sin janusz receta médica. Los LUPILLO pueden causar sangrado estomacal o problemas renales en ciertas personas. Si usted gilberto un medicamento anticoagulante, siempre pregúntele a carrillo médico si los LUPILLO son seguros para usted. Siempre janie la etiqueta de irma medicamento y siga las instrucciones.      ?Puede administrarsepodrían administrarse. Pregunte al médico cómo debe guille irma medicamento de forma chen. Algunos medicamentos recetados para el dolor contienen acetaminofén. No tome otros medicamentos que contengan acetaminofén sin consultarlo con carrillo médico. Demasiado acetaminofeno puede causar daño al hígado. Los medicamentos recetados para el dolor podrían causar estreñimiento. Pregunte a carrillo médico seth prevenir o tratar estreñimiento.      •La cirugíapodría ser necesaria para extirpar el quiste ovárico.      ¿Cómo puedo controlar los quistes ováricos?Puede controlar un quiste actual y ayudar a los médicos a encontrar quistes futuros en forma temprana.  •Aplique calor para reducir dolor y los calambres causados por el quisteSiéntese en la danii del baño en agua tibia o coloque un colchón térmico (a temperatura baja) sobre el abdomen. Raquel esto por 15 a 20 minutos cada hora para sentirse más cómoda.      •Hágase exámenes pélvicos o pruebas de Papanicolaou en forma regular.[place] ayudará a los médicos a encontrar cualquier quiste ovárico nuevo. Informe a carrillo médico si nota cualquier cambio inusual en carrillo ciclo menstrual.      Llame al número de emergencias local (911 en los [place]) si:  •Usted tiene un intenso dolor con fiebre y vómitos.      •Usted tiene dolor abdominal severo y repentino.      •Usted se siente demasiado débil, mareada o que se va a desmayar seth para ponerse de pie.      •Tiene la respiración muy acelerada.      ¿Cuándo marlene llamar a mi médico?  •Nia períodos son antes o después de tiempo o más dolorosos que de costumbre.      •Usted tiene preguntas o inquietudes acerca de carrillo condición o cuidado.      ACUERDOS SOBRE CARRILLO CUIDADO:    Usted tiene el derecho de ayudar a planear carrillo cuidado. Aprenda todo lo que pueda sobre carrillo condición y seth darle tratamiento. Discuta nia opciones de tratamiento con nia médicos para decidir el cuidado que usted desea recibir. Usted siempre tiene el derecho de rechazar el tratamiento.

## 2021-10-27 NOTE — ED ADULT NURSE NOTE - NSIMPLEMENTINTERV_GEN_ALL_ED
Implemented All Universal Safety Interventions:  [place] to call system. Call bell, personal items and telephone within reach. Instruct patient to call for assistance. Room bathroom lighting operational. Non-slip footwear when patient is off stretcher. Physically safe environment: no spills, clutter or unnecessary equipment. Stretcher in lowest position, wheels locked, appropriate side rails in place.

## 2021-10-27 NOTE — ED ADULT NURSE NOTE - CHIEF COMPLAINT QUOTE
Pt states she was seen here last night for same issues.  C/O left sided abdominal pain and burning in stomach.  States she feels like something is going to "burst".  : [id number]

## 2022-03-23 NOTE — ED ADULT NURSE NOTE - LANGUAGE ASSISTANCE NEEDED
Name: Alexey Walker  : 1984         Chief Complaint:     Chief Complaint   Patient presents with   [address] Problem       History of Present Illness:      Alexey Walker is a 34 y.o.  female who presents with Mental Health Problem      HPI     Patient was admitted to [facility] from 3/16 to 3/22 for depression with SI. Initial post-discharge communication occurred between medical assistant and patient on 3/22- see documentation in chart: telephone encounter. Diagnostic test results reviewed: none    Patient risk of morbidity and mortality: moderate    Medical Decision Making: moderate complexity    Depression had become uncontrolled, patient feeling overwhelmed by stress, back pain, work, recent marriage. Guilt regarding relapse into alcohol use. Per hospital records patient had also been using narcotics and prescription stimulants but she denies this, states she had used stimulants with her sister couple years ago but no narcotics, though she says she would have used them if she had any.   lexapro dose increased. Was on rexulti while in the hospital but still working on insurance coverage. Hasn't picked up buspar yet. Will restart counseling, has phone visit today and then in-person appt 3/25. Also found a practice called [facility] that does IOP and is interested in that, has intake with them today also. Psychiatry , Dr. Zakia Larson. Suicidal thoughts on abilify in the past. trintellix didn't work well. Couple days didn't go to work because she was unable to get out of bed, then couldn't even call in d/t paralyzing fear of calling and talking to people there. Has had trouble holding down jobs over the years d/t similar. Struggles with relationship with sister, who is pt's only local family member and also an alcoholic and has not been helpful in pt's sobriety. Patient has been frustrated regarding ongoing chronic low back pain. Recently did physical therapy without adequate relief. Plans to try dry needling and I also ordered an MRI. She has been using Indocin instead of ibuprofen recently and that has been helping. Past Medical History:     Past Medical History:   Diagnosis Date    Anxiety     Bulging of lumbar intervertebral disc without myelopathy     L1    Chronic back pain     Depression     GERD (gastroesophageal reflux disease)     Substance abuse (HCC)     alcohol and opiates        Past Surgical History:     Past Surgical History:   Procedure Laterality Date    COLONOSCOPY  05/26/2020    PILONIDAL CYST EXCISION  03/2018    PLANTAR FASCIA SURGERY Left 06/11/2020        Medications:       Prior to Admission medications    Medication Sig Start Date End Date Taking? Authorizing Provider   escitalopram (LEXAPRO) 20 MG tablet  3/22/22  Yes Historical Provider, MD   carisoprodol (SOMA) 350 MG tablet Take 1 tablet by mouth 3 times daily as needed for Muscle spasms for up to 30 days.  3/14/22 4/13/22 Yes Lilibeth Spann DO   indomethacin (INDOCIN SR) 75 MG extended release capsule Take 1 capsule by mouth 2 times daily (with meals) 3/7/22  Yes Anna Trotter,    traZODone (DESYREL) 50 MG tablet TAKE 1 TO 2 TABLETS NIGHTLY AS NEEDED 2/28/22  Yes Lilibeth Spann DO   triamcinolone (KENALOG) 0.1 % ointment Apply topically 2 times daily 2/1/22  Yes Lilibeth Spann DO   topiramate (TOPAMAX) 25 MG tablet Take 1 tablet by mouth nightly 2/1/22  Yes Lilibeth Spann DO   hydrOXYzine (ATARAX) 25 MG tablet Take 1 tablet by mouth 2 times daily as needed for Anxiety 11/11/21  Yes Lilibeth Spann DO   gabapentin (NEURONTIN) 600 MG tablet  10/27/21  Yes Historical Provider, MD   omeprazole (PRILOSEC) 40 MG delayed release capsule TAKE 1 CAPSULE BY MOUTH EVERY DAY 10/29/21  Yes Lilibeth Spann DO   ibuprofen (ADVIL;MOTRIN) 800 MG tablet Take 1 tablet by mouth every 6 hours as needed for Pain (for back pain) 4/21/21  Yes Lilibeth Spann DO   albuterol sulfate  (90 Base) MCG/ACT inhaler Inhale 2 puffs into the lungs 4 times daily 10/21/20  Yes Lissa Castañeda MD   brexpiprazole (REXULTI) 0.5 MG TABS tablet Brexpiprazole (Rexulti) 0.5 mg Tablet Active 0.5 MG PO Bedtime 30 30 March 22nd, 2022 12:13pm  Patient not taking: Reported on 3/23/2022 3/22/22   Historical Provider, MD   busPIRone (BUSPAR) 7.5 MG tablet Buspirone Active 7.5 MG PO Twice daily 60 30 March 22nd, 2022 12:14pm  Patient not taking: Reported on 3/23/2022 3/22/22   Historical Provider, MD        Allergies:       Patient has no known allergies. Social History:     Tobacco:    reports that she has quit smoking. Her smoking use included cigarettes. She has a 2.00 pack-year smoking history. She has never used smokeless tobacco.  Alcohol:      reports previous alcohol use. Drug Use:  reports previous drug use. Family History:     Family History   Problem Relation Age of Onset    Diabetes Mother        Review of Systems:     Positive and Negative as described in HPI    Review of Systems   Constitutional: Negative. Respiratory: Negative. Gastrointestinal: Negative. Physical Exam:     Vitals:  /66   Pulse 88   Ht 5' 6\" (1.676 m)   Wt 206 lb (93.4 kg)   SpO2 99%   BMI 33.25 kg/m²   Physical Exam  Vitals and nursing note reviewed. Constitutional:       General: She is not in acute distress. Appearance: She is well-developed. Pulmonary:      Effort: Pulmonary effort is normal.   Skin:     General: Skin is warm and dry. Neurological:      Mental Status: She is alert and oriented to person, place, and time. Psychiatric:      Comments: Tearful for much of visit. Good eye contact and fluid speech.          Data:     Lab Results   Component Value Date     09/19/2021    K 3.9 09/19/2021    CL 99 09/19/2021    CO2 26 09/19/2021    BUN 7 09/19/2021    CREATININE 0.88 09/19/2021    GLUCOSE 95 09/19/2021    PROT 7.3 03/12/2021    LABALBU 4.5 03/12/2021    BILITOT 0.11 03/12/2021    ALKPHOS 49 03/12/2021    AST 19 03/12/2021    ALT 31 03/12/2021     Lab Results   Component Value Date    WBC 12.0 09/19/2021    RBC 4.76 09/19/2021    HGB 13.9 09/19/2021    HCT 40.5 09/19/2021    MCV 85.2 09/19/2021    MCH 29.2 09/19/2021    MCHC 34.3 09/19/2021    RDW 12.6 09/19/2021     09/19/2021    MPV NOT REPORTED 09/19/2021     Lab Results   Component Value Date    TSH 0.69 10/22/2019     Lab Results   Component Value Date    LABA1C 5.3 09/19/2018         Assessment & Plan:        Diagnosis Orders   1. Depression with suicidal ideation     2. Hospital discharge follow-up     3. Alcoholism ([facility] [address]     4. Chronic bilateral low back pain without sciatica       Chronic depression, worse recently d/t situational factors including chronic pain, alcoholism. Developed SI. No attempt. Reviewed hospital records. Continue increased dose of lexapro. Call if unable to obtain rexulti. Keep psych f/u. Discussed substance abuse tx. Pt would benefit from some more time off work - will plan to return 4/5 which will be after her psychiatry appt 4/4. Persistent low back pain, failed PT - MRI ordered last wk. Requested Prescriptions      No prescriptions requested or ordered in this encounter       Patient Instructions   SURVEY:    You may be receiving a survey from [employer] regarding your visit today. You may get this in the mail, through your MyChart or in your email. Please complete the survey to enable us to provide the highest quality of care to you and your family. If you cannot score us as very good ( 5 Stars) on any question, please feel free to call the office to discuss how we could have made your experience exceptional.     Thank you.     Clinical Care Team:  Dr. Stephanie Agee, DO Kendy Robledo, [facility] Team:  Emerita Oglesby received counseling on the following healthy behaviors: medication adherence  Reviewed prior labs and health maintenance. Continue current medications, diet and exercise. Discussed use, benefit, and side effects of prescribed medications. Barriers to medication compliance addressed. Patient given educational materials - see patient instructions. All patient questions answered. Patient voiced understanding.      Electronically signed by Keyla Ortiz DO on 3/27/2022 at 4:16 PM   [address]  Dept: [phone] Yes-Patient/Caregiver accepts free interpretation services...

## 2022-04-22 NOTE — ED ADULT NURSE NOTE - PATIENT/CAREGIVER OFFERED  INTERPRETER SERVICES
"ED Provider Note    CHIEF COMPLAINT  Chief Complaint   Patient presents with   • Flank Pain     Pt reports R flank pain. States \"I'm passing kidney stones\". Hx of kidney stones       HPI  Patti Tello is a 55 y.o. female who presents with right-sided low back pain.  She is worried that she is passing kidney stones.  She reports she had a recent CT scan that demonstrated kidney stones.  She shows me a CT report there is no mention of kidney stones, but there is mention of renal cysts that had been stable.  She started have pain a few days ago.  Gradually worsening.  Sharp character.  Worse with twisting movement bending.  Has not had a fever or rash.  She has urinary frequency.  No dysuria.  Has not had fever.  No nausea vomiting or diarrhea.  Normal bowel movements.    Patient suffers from chronic back pain.  She is followed by pain management in [place].    Patient has chronic abdominal pain.  She recently had hernia surgery with Dr. Manning.  She saw Dr. Manning earlier this week because she had continued to have abdominal pain although she reports that now her abdominal pain is improving.    Patient not had chest pain shortness of breath, fevers, rash, trauma, weakness, numbness, bowel or bladder dysfunction or saddle anesthesia.    REVIEW OF SYSTEMS  As per HPI, otherwise a 10 point review of systems is negative    PAST MEDICAL HISTORY  Past Medical History:   Diagnosis Date   • Anesthesia     pt states hard time waking up   • Anxiety and depression    • Arthritis     back, neck hands feet   • Asthma     prn inhaler   • Back injury    • Back pain    • Bronchitis 2010   • Cancer (HCC) 1995    breast LT/ chemo   • Chickenpox    • Chronic LBP    • Chronic neck pain    • Cold intolerance 1/24/2012   • COPD (chronic obstructive pulmonary disease) ([facility])    • Cystic fibrosis ([facility])    • Dental disorder     upper and lower dentures   • Depression    • Diabetes    • GERD (gastroesophageal reflux disease)    • H/O " gastric bypass 10/11/2013   • Herniated nucleus pulposus, L4-5 3/18/2010   • HX: breast cancer 10/11/2013   • Hypertension     pt states controlled on meds   • Itching due to drug 6/2/2011   • Kidney stone    • Nephrolithiasis 10/11/2013   • Obesity    • Osteoporosis    • Other specified symptom associated with female genital organs    • Panic disorder    • Pneumonia    • PONV (postoperative nausea and vomiting)    • PTSD (post-traumatic stress disorder)    • Restless leg syndrome    • Rheumatoid arthritis (HCC)    • S/P laminectomy 10/22/2012   • Seizure (HCC) 09/2020    gabapentin related   • Thoracic or lumbosacral neuritis or radiculitis, unspecified 10/22/2012   • Trauma     head trauma       SOCIAL HISTORY  Social History     Tobacco Use   • Smoking status: Never Smoker   • Smokeless tobacco: Never Used   Vaping Use   • Vaping Use: Never used   Substance Use Topics   • Alcohol use: No   • Drug use: Yes     Types: Marijuana     Comment: Gummys for pain       SURGICAL HISTORY  Past Surgical History:   Procedure Laterality Date   • VENTRAL HERNIA REPAIR ROBOTIC XI N/A 4/8/2022    Procedure: ROBOTIC ASSISTED INCISIONAL HERNIA REPAIR WITH MESH, EXTENSIVE LYSIS OF ADHESIONS;  Surgeon: Yousif Juarez M.D.;  Location: [facility];  Service: General   • PB IMPLANT NEUROSTIM/  3/11/2021    Procedure: INSERTION, NEUROSTIMULATOR, PERMANENT, SPINAL CORD;  Surgeon: Aditi Ash M.D.;  Location: [facility];  Service: Pain Management   • BLADDER SLING FEMALE N/A 11/4/2020    Procedure: BLADDER SLING, FEMALE- RETRO PUBIC SLING;  Surgeon: Kat Carrion M.D.;  Location: [facility];  Service: Gynecology   • CYSTOSCOPY N/A 11/4/2020    Procedure: CYSTOSCOPY;  Surgeon: Kat Carrion M.D.;  Location: [facility];  Service: Gynecology   • URETEROSCOPY  10/10/2013    Performed by Molly Resendiz M.D. at [facility] ORS   • LASERTRIPSY  10/10/2013    Performed by  "Molly Resendiz M.D. at SURGERY [facility] ORS   • FUSION, SPINE, LUMBAR, PLIF  10/22/2012    Performed by Alfred Branham M.D. at SURGERY [facility] ORS   • LUMBAR LAMINECTOMY DISKECTOMY  10/22/2012    Performed by Alfred Branham M.D. at SURGERY [facility] ORS   • OTHER ORTHOPEDIC SURGERY  10/01/2012    lumbar fusion, Dr Branham   • OTHER ABDOMINAL SURGERY  2006    gastric bypass   • ABDOMINAL HYSTERECTOMY TOTAL      DUE TO FIBROIDS   • BREAST RECONSTRUCTION      Lt breast   • CRANIOTOMY      DUE TO BRAIN INJURY   • HYSTERECTOMY LAPAROSCOPY     • LAMINOTOMY     • MASTECTOMY      Lt breast   • OTHER      left ear drum surgery   • OTHER ABDOMINAL SURGERY      hernia repair   • PRIMARY C SECTION      x 3   • SLEEVE,MAXIMILIAN VASO THIGH     • TONSILLECTOMY     • US-CYST ASPIRATION-BREAST INITIAL     • VENTILATOR CONTINUOUS         CURRENT MEDICATIONS  Home Medications     Reviewed by Connie Aden R.N. (Registered Nurse) on 04/22/22 at 0455  Med List Status: <None>   Medication Last Dose Status   albuterol 108 (90 Base) MCG/ACT Aero Soln inhalation aerosol  Active   amoxicillin (AMOXIL) 500 MG Cap  Active   budesonide-formoterol (SYMBICORT) 160-4.5 MCG/ACT Aerosol  Active   magnesium hydroxide (MILK OF MAGNESIA) 400 MG/5ML Suspension  Active   sertraline (ZOLOFT) 100 MG Tab  Active                ALLERGIES  Allergies   Allergen Reactions   • Buprenorphine-Naloxone Hives, Shortness of Breath and Swelling   • Gabapentin      seizures   • Suboxone Hives, Shortness of Breath and Swelling   • Morphine Rash     tachycardia   • Shrimp (Diagnostic) Itching   • Contrast Media With Iodine [Iodine] Itching     Ok with benadryl       PHYSICAL EXAM  VITAL SIGNS: /58   Pulse 80   Temp 36.1 °C (97 °F) (Temporal)   Resp 18   Ht 1.626 m (5' 4\")   Wt 81.1 kg (178 lb 12.7 oz)   LMP 10/05/1999   SpO2 92%   BMI 30.69 kg/m²    Constitutional: Awake and alert.  Anxious appearing  HENT: Normal inspection  Eyes: Normal " inspection  Neck: Grossly normal range of motion.  Cardiovascular: Normal heart rate, Normal rhythm.  Symmetric peripheral pulses.   Thorax & Lungs: No respiratory distress, No wheezing, No rales, No rhonchi, No chest tenderness.   Abdomen: Bowel sounds normal, soft, non-distended, nontender, no mass.  Surgical wounds healing well  Skin: No flank or back rash/vesicles.  Back: No CVA tenderness.  Tenderness of the right upper lumbar paraspinous musculature.  There is a midline surgical scar.  Extremities: No clubbing, cyanosis, edema, no Homans or cords.  Neurologic: Grossly normal   Psychiatric: Normal for situation        Labs:  Results for orders placed or performed during the hospital encounter of 04/22/22   CBC WITH DIFFERENTIAL   Result Value Ref Range    WBC 10.1 4.8 - 10.8 K/uL    RBC 4.81 4.20 - 5.40 M/uL    Hemoglobin 13.1 12.0 - 16.0 g/dL    Hematocrit 42.1 37.0 - 47.0 %    MCV 87.5 81.4 - 97.8 fL    MCH 27.2 27.0 - 33.0 pg    MCHC 31.1 (L) 33.6 - 35.0 g/dL    RDW 46.0 35.9 - 50.0 fL    Platelet Count 294 164 - 446 K/uL    MPV 10.9 9.0 - 12.9 fL    Neutrophils-Polys 58.20 44.00 - 72.00 %    Lymphocytes 28.00 22.00 - 41.00 %    Monocytes 6.00 0.00 - 13.40 %    Eosinophils 6.60 0.00 - 6.90 %    Basophils 0.70 0.00 - 1.80 %    Immature Granulocytes 0.50 0.00 - 0.90 %    Nucleated RBC 0.00 /100 WBC    Neutrophils (Absolute) 5.90 2.00 - 7.15 K/uL    Lymphs (Absolute) 2.84 1.00 - 4.80 K/uL    Monos (Absolute) 0.61 0.00 - 0.85 K/uL    Eos (Absolute) 0.67 (H) 0.00 - 0.51 K/uL    Baso (Absolute) 0.07 0.00 - 0.12 K/uL    Immature Granulocytes (abs) 0.05 0.00 - 0.11 K/uL    NRBC (Absolute) 0.00 K/uL   COMP METABOLIC PANEL   Result Value Ref Range    Sodium 141 135 - 145 mmol/L    Potassium 3.5 (L) 3.6 - 5.5 mmol/L    Chloride 107 96 - 112 mmol/L    Co2 23 20 - 33 mmol/L    Anion Gap 11.0 7.0 - 16.0    Glucose 121 (H) 65 - 99 mg/dL    Bun 12 8 - 22 mg/dL    Creatinine 0.84 0.50 - 1.40 mg/dL    Calcium 9.4 8.5 - 10.5  mg/dL    AST(SGOT) 20 12 - 45 U/L    ALT(SGPT) 16 2 - 50 U/L    Alkaline Phosphatase 126 (H) 30 - 99 U/L    Total Bilirubin 0.2 0.1 - 1.5 mg/dL    Albumin 4.3 3.2 - 4.9 g/dL    Total Protein 7.2 6.0 - 8.2 g/dL    Globulin 2.9 1.9 - 3.5 g/dL    A-G Ratio 1.5 g/dL   LIPASE   Result Value Ref Range    Lipase 21 11 - 82 U/L   URINALYSIS    Specimen: Urine, Clean Catch; Blood   Result Value Ref Range    Color Yellow     Character Clear     Specific Gravity 1.015 <1.035    Ph 5.5 5.0 - 8.0    Glucose Negative Negative mg/dL    Ketones Negative Negative mg/dL    Protein Negative Negative mg/dL    Bilirubin Negative Negative    Urobilinogen, Urine 0.2 Negative    Nitrite Negative Negative    Leukocyte Esterase Negative Negative    Occult Blood Negative Negative    Micro Urine Req see below    ESTIMATED GFR   Result Value Ref Range    GFR (CKD-EPI) 81 >60 mL/min/1.73 m 2       Medications   ketorolac (TORADOL) injection 15 mg (15 mg Intravenous Given 4/22/22 0631)         COURSE & MEDICAL DECISION MAKING  Patient presents with back pain.  This seems most likely musculoskeletal given worsening with movement bending.  She describes having kidney stones however recent CT scan just 10 days ago did not demonstrate this.  Obtained UA without blood or leukocyte Estrace.  No suggestion of infection.  Her other laboratory data is entirely unremarkable.  Given her history of chronic back pain I suspect this is an exacerbation of her chronic pain.  She has no red flags for emergency spinal cord impingement syndrome or infection.  She was given Toradol in the ER.  She will need to see her pain management provider.  I have asked her to call today to set up an appointment.  She should return to the ER for fever, worsening symptoms, weakness in the extremities, not improving or concern.    FINAL IMPRESSION  1.  Back pain, acute exacerbation of chronic pain      This dictation was created using voice recognition software. The accuracy of  the dictation is limited to the abilities of the software.  The nursing notes were reviewed and certain aspects of this information were incorporated into this note.      Electronically signed by: Yaron Hebert M.D., 4/22/2022 6:50 AM     yes

## 2022-05-10 ENCOUNTER — APPOINTMENT (OUTPATIENT)
Dept: GASTROENTEROLOGY | Facility: HOSPITAL | Age: 42
End: 2022-05-10

## 2022-05-20 ENCOUNTER — APPOINTMENT (OUTPATIENT)
Dept: GASTROENTEROLOGY | Facility: AMBULATORY MEDICAL SERVICES | Age: 42
End: 2022-05-20
Payer: COMMERCIAL

## 2022-05-20 ENCOUNTER — RESULT REVIEW (OUTPATIENT)
Age: 42
End: 2022-05-20

## 2022-05-20 DIAGNOSIS — K29.70 GASTRITIS, UNSPECIFIED, W/OUT BLEEDING: ICD-10-CM

## 2022-05-20 PROCEDURE — 43239 EGD BIOPSY SINGLE/MULTIPLE: CPT

## 2022-05-20 RX ORDER — OMEPRAZOLE 40 MG/1
40 CAPSULE, DELAYED RELEASE ORAL
Qty: 30 | Refills: 5 | Status: ACTIVE | COMMUNITY
Start: 2022-05-20 | End: 1900-01-01

## 2022-10-10 ENCOUNTER — APPOINTMENT (OUTPATIENT)
Dept: MAMMOGRAPHY | Facility: CLINIC | Age: 42
End: 2022-10-10

## 2022-10-10 ENCOUNTER — OUTPATIENT (OUTPATIENT)
Dept: OUTPATIENT SERVICES | Facility: HOSPITAL | Age: 42
LOS: 1 days | End: 2022-10-10
Payer: COMMERCIAL

## 2022-10-10 DIAGNOSIS — Z01.419 ENCOUNTER FOR GYNECOLOGICAL EXAMINATION (GENERAL) (ROUTINE) WITHOUT ABNORMAL FINDINGS: ICD-10-CM

## 2022-10-10 DIAGNOSIS — Z90.49 ACQUIRED ABSENCE OF OTHER SPECIFIED PARTS OF DIGESTIVE TRACT: Chronic | ICD-10-CM

## 2022-10-10 PROCEDURE — 77067 SCR MAMMO BI INCL CAD: CPT | Mod: 26

## 2022-10-10 PROCEDURE — 77063 BREAST TOMOSYNTHESIS BI: CPT

## 2022-10-10 PROCEDURE — 77063 BREAST TOMOSYNTHESIS BI: CPT | Mod: 26

## 2022-10-10 PROCEDURE — 77067 SCR MAMMO BI INCL CAD: CPT

## 2023-01-14 ENCOUNTER — EMERGENCY (EMERGENCY)
Facility: HOSPITAL | Age: 43
LOS: 0 days | Discharge: HOME | End: 2023-01-14
Attending: STUDENT IN AN ORGANIZED HEALTH CARE EDUCATION/TRAINING PROGRAM
Payer: MEDICAID

## 2023-01-14 VITALS
RESPIRATION RATE: 16 BRPM | DIASTOLIC BLOOD PRESSURE: 76 MMHG | HEART RATE: 74 BPM | OXYGEN SATURATION: 100 % | SYSTOLIC BLOOD PRESSURE: 128 MMHG | TEMPERATURE: 99 F

## 2023-01-14 VITALS — WEIGHT: 149.91 LBS

## 2023-01-14 DIAGNOSIS — M54.2 CERVICALGIA: ICD-10-CM

## 2023-01-14 DIAGNOSIS — M25.511 PAIN IN RIGHT SHOULDER: ICD-10-CM

## 2023-01-14 DIAGNOSIS — H92.01 OTALGIA, RIGHT EAR: ICD-10-CM
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
## 2023-01-14 DIAGNOSIS — R51.9 HEADACHE, UNSPECIFIED: ICD-10-CM

## 2023-01-14 DIAGNOSIS — S16.1XXA STRAIN OF MUSCLE, FASCIA AND TENDON AT NECK LEVEL, INITIAL ENCOUNTER: ICD-10-CM

## 2023-01-14 DIAGNOSIS — E03.9 HYPOTHYROIDISM, UNSPECIFIED: ICD-10-CM

## 2023-01-14 DIAGNOSIS — M54.9 DORSALGIA, UNSPECIFIED: ICD-10-CM
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
## 2023-01-14 DIAGNOSIS — Z90.49 ACQUIRED ABSENCE OF OTHER SPECIFIED PARTS OF DIGESTIVE TRACT: Chronic | ICD-10-CM

## 2023-01-14 DIAGNOSIS — Z87.19 PERSONAL HISTORY OF OTHER DISEASES OF THE DIGESTIVE SYSTEM: ICD-10-CM

## 2023-01-14 DIAGNOSIS — Z90.49 ACQUIRED ABSENCE OF OTHER SPECIFIED PARTS OF DIGESTIVE TRACT: ICD-10-CM

## 2023-01-14 PROCEDURE — 99283 EMERGENCY DEPT VISIT LOW MDM: CPT

## 2023-01-14 PROCEDURE — 99284 EMERGENCY DEPT VISIT MOD MDM: CPT

## 2023-01-14 RX ORDER — LIDOCAINE 4 G/100G
1 CREAM TOPICAL ONCE
Refills: 0 | Status: COMPLETED | OUTPATIENT
Start: 2023-01-14 | End: 2023-01-14

## 2023-01-14 RX ORDER — METHOCARBAMOL 500 MG/1
1500 TABLET, FILM COATED ORAL ONCE
Refills: 0 | Status: COMPLETED | OUTPATIENT
Start: 2023-01-14 | End: 2023-01-14

## 2023-01-14 RX ORDER — METHOCARBAMOL 500 MG/1
2 TABLET, FILM COATED ORAL
Qty: 30 | Refills: 0
Start: 2023-01-14 | End: 2023-01-18

## 2023-01-14 RX ORDER — FAMOTIDINE 10 MG/ML
1 INJECTION INTRAVENOUS
Qty: 14 | Refills: 0
Start: 2023-01-14 | End: 2023-01-20

## 2023-01-14 RX ORDER — ACETAMINOPHEN 500 MG
650 TABLET ORAL ONCE
Refills: 0 | Status: COMPLETED | OUTPATIENT
Start: 2023-01-14 | End: 2023-01-14

## 2023-01-14 RX ADMIN — Medication 650 MILLIGRAM(S): at 12:53

## 2023-01-14 RX ADMIN — METHOCARBAMOL 1500 MILLIGRAM(S): 500 TABLET, FILM COATED ORAL at 12:58

## 2023-01-14 RX ADMIN — LIDOCAINE 1 PATCH: 4 CREAM TOPICAL at 12:53

## 2023-01-14 NOTE — ED STATDOCS - PATIENT PORTAL LINK FT
You can access the FollowMyHealth Patient Portal offered by [facility] by registering at the following website: [email or web address]. By joining [facility]’s FollowMyHealth portal, you will also be able to view your health information using other applications (apps) compatible with our system.

## 2023-01-14 NOTE — ED STATDOCS - OBJECTIVE STATEMENT
[place] ID: [id number]  42 year old female w/ PMHx of hypothyroidism, gastritis s/p cholecystectomy presents to the ED c/o right sided neck/shoulder and ear pain radiating to chest since yesterday. Had prior episode one month ago, resolved on its own. Pain began again on friday when she woke up, denies strenuous activity but takes care of 2 children at home. Reports pain especially with movement. Took Tylenol and Advil or pain last night, with mild relief. Denies headache, injury, trauma. Denies alcohol/drug use.

## 2023-01-14 NOTE — ED STATDOCS - CLINICAL SUMMARY MEDICAL DECISION MAKING FREE TEXT BOX
41 yo female with a PMH of gastritis and hypothyroid presents with paracervical and R trapezius pain. Pain started intiially 1 month ago and returned friday. Pt takign medication with mild relief but still has the pain.   PE: no bony or midline ttp.  FROm of the UEs.  Likely msk in nature. Will treat with meds and d/c home. -Bruce Campos PA-C 41 yo female with a PMH of gastritis and hypothyroid presents with paracervical and R trapezius pain. Pain started intiially 1 month ago and returned friday. Pt takign medication with mild relief but still has the pain.   PE: no bony or midline ttp.  FROm of the UEs.  Likely msk in nature. Will treat with meds and d/c home. -Bruce Campos PA-C    Patient with no red flags; likely musculoskeletal in origin; supportive care; f/u as instructed.

## 2023-01-14 NOTE — ED STATDOCS - ATTENDING APP SHARED VISIT CONTRIBUTION OF CARE
I, Kalee Barajas DO,  performed the initial face to face bedside interview with this patient regarding history of present illness, review of symptoms and relevant past medical, social and family history.  I completed an independent physical examination.  I was the initial provider who evaluated this patient.   I personally saw the patient and performed a substantive portion of the visit including all aspects of the medical decision making.  I have signed out the follow up of any pending tests (i.e. labs, radiological studies) to the ACP.  I have communicated the patient’s plan of care and disposition with the ACP.  The history, relevant review of systems, past medical and surgical history, medical decision making, and physical examination was documented by the scribe in my presence and I attest to the accuracy of the documentation.

## 2023-01-14 NOTE — ED STATDOCS - MUSCULOSKELETAL, MLM
+right paracervical and right trapezius ttp, full ROM of b/l extremities. No midline ttp to neck or back. No visible trauma or injury.

## 2023-01-14 NOTE — ED STATDOCS - PROGRESS NOTE DETAILS
41 yo Slovak speaking female ( [id number]) with a PMH of gastritis and hypothyroid presents with R neck, shoulder, and R ear pain radiating to the R chest. Pt had symptoms 1 month ago which spontaneously resolved and symptoms returned friday. Pt denies any strenuous activities but takes care of 2 kids at home. Has been taking tylenol and advil with mild relief. Has seen her PMD and was told that she has some inflammation. PE: TTP to the R paracervical and R trapezius. No midline ttp to the neck or back. No bony ttp to the shoulder. Likely msk in nature. Will treat with meds and d/c home with muscle relaxers. Pt was made aware of treatment and plan and agrees. -Bruce Campos PA-C

## 2023-01-14 NOTE — ED STATDOCS - NSFOLLOWUPINSTRUCTIONS_ED_ALL_ED_FT
Distensión muscular    Muscle Strain      Janusz distensión muscular es janusz lesión que se produce cuando un músculo se estira más allá de carrillo tommy normal. Cuando esto sucede, por lo general, se desgarra janusz pequeña cantidad de fibras musculares. Hay maria isabel tipos de distensiones musculares. Las distensiones de primer laura son aquellas en las cuales el desgarro afecta a la faye cantidad de fibras musculares y las menos dolorosas. Las distensiones de harmony y tercer laura involucran janusz proporción cada vez mayor de desgarro y dolor.    En general, la recuperación de janusz distensión muscular tarda de 1 a 2 semanas. La recuperación completa normalmente tarda de 5 a 6 semanas.      ¿Cuáles son las causas?    Esta afección ocurre cuando se aplica janusz fuerza violenta y súbita sobre un músculo y aubrie se estira demasiado. [facility] puede ocurrir suad janusz caída, cuando se levantan objetos o cuando se practican deportes.      ¿Qué incrementa el riesgo?    Es más probable que esta afección se manifieste en atletas y personas físicamente activas.      ¿Cuáles son los signos o síntomas?    Los síntomas de esta afección incluyen:  •Dolor.      •Sensibilidad.      •Moretones.      •Hinchazón.      •Dificultad cuando se usa el músculo.        ¿Cómo se diagnostica?    Esta afección se diagnostica en función de un examen físico y de los antecedentes médicos. También se pueden hacer estudios seth radiografía, ecografía o resonancia magnética (RM).      ¿Cómo se trata?    Inicialmente, se trata con terapia PRICE (protección, reposo, hielo, compresión, elevación). Esta terapia incluye lo siguiente:  •Proteger al músculo de nuevas lesiones.      •Reposo del músculo lesionado.      •Aplicación de hielo en el músculo lesionado.      •Aplicación de presión (compresión) en el músculo lesionado. [facility] se puede hacer con janusz férula o janusz venda elástica.      •Elevación del músculo lesionado.      El médico también puede recomendarle analgésicos.      Siga estas indicaciones en carrillo casa:    Si tiene janusz férula extraíble:     •Use la férula seth se lo haya indicado el médico. Quítesela solamente seth se lo haya indicado el médico.       •Controle todos los días la piel alrededor de la férula. Informe al médico acerca de cualquier inquietud.      •Afloje la férula si los dedos de las sukh o de los pies se le entumecen, siente hormigueos o se le enfrían y se tornan de color yudith.      •Mantenga la férula limpia.    •Si la férula no es impermeable:  •No deje que se moje.      •Cúbrala con un envoltorio hermético cuando tome un baño de inmersión o janusz ducha.          Control del dolor, la rigidez y la hinchazón   Bag of ice on a towel on the skin. •Si se lo indican, aplique hielo sobre la vikki de la lesión. Para hacer esto:  •Si tiene janusz férula desmontable, quítesela seth se lo haya indicado el médico.      •Ponga el hielo en janusz bolsa plástica.      •Coloque janusz toalla entre la piel y la bolsa.      •Aplique el hielo suad 20 minutos, 2 o 3 veces por día.      •Retire el hielo si la piel se pone de color solano brillante. [facility] es muy importante. Si no puede sentir dolor, calor o frío, tiene un mayor riesgo de que se dañe la vikki.        •Mueva los dedos de las sukh o de los pies con frecuencia para reducir la rigidez y la hinchazón.      •Cuando esté sentado o acostado, alce (eleve) la vikki de la lesión por encima del nivel del corazón.      •Use janusz venda elástica seth se lo haya indicado el médico. Asegúrese de no ajustarla demasiado.      Indicaciones generales     •Use los medicamentos de venta odalys y los recetados solamente seth se lo haya indicado el médico. El tratamiento puede incluir relajantes musculares o medicamentos para el dolor y la inflamación que se patti por boca o que se aplican sobre la piel.      •Restrinja las actividades y court reposo del músculo lesionado seth se lo haya indicado el médico. Posiblemente le permitan hacer movimientos suaves.      •Si le indicaron fisioterapia, court los ejercicios seth se lo haya indicado el médico.      • No ejerza presión en ninguna parte de la férula hasta que se haya endurecido por completo. [facility] puede tardar varias horas.      • No consuma ningún producto que contenga nicotina o tabaco. Estos productos incluyen cigarrillos, tabaco para mascar y aparatos de vapeo, seth los cigarrillos electrónicos. Si necesita ayuda para dejar de consumir estos productos, consulte al médico.      •Pregúntele al médico cuándo puede volver a conducir si tiene janusz férula.      •Concurra a todas las visitas de seguimiento. [facility] es importante.        ¿Cómo se ruy?    Precaliente antes de la actividad física. [facility] ayuda a prevenir futuras distensiones musculares.      Comuníquese con un médico si:    •Siente más dolor o tiene más hinchazón en la vikki lesionada.        Solicite ayuda de inmediato si:    •Tiene adormecimiento u hormigueo en la vikki lesionada.      •Nota janusz pérdida importante de fuerza en la vikki lesionada.        Resumen    •Janusz distensión muscular es janusz lesión que se produce cuando un músculo se estira más allá de carrillo tommy normal.      •Esta afección ocurre cuando se aplica janusz fuerza violenta y súbita sobre un músculo y aubrie se estira demasiado.      •Esta afección se trata inicialmente con terapia PRICE, que significa protección, reposo, hielo, compresión y elevación.      •Posiblemente le permitan hacer movimientos suaves. Si le indicaron fisioterapia, court los ejercicios seth se lo haya indicado el médico.

## 2023-01-14 NOTE — ED ADULT NURSE NOTE - DISCHARGE DATE/TIME
14-Jan-2023 12:59 2.5 Mm Punch Excision Text: A 2.5 mm punch biopsy was used to excise the lesion to the level of the subcutaneous fat.  Blunt dissection was used to free the lesion from the surrounding tissues and the lesion was removed.

## 2023-03-28 NOTE — ED STATDOCS - NS_ATTENDINGSCRIBE_ED_ALL_ED
Home
I personally performed the service described in the documentation recorded by the scribe in my presence, and it accurately and completely records my words and actions.

## 2023-04-27 ENCOUNTER — EMERGENCY (EMERGENCY)
Facility: HOSPITAL | Age: 43
LOS: 0 days | Discharge: HOME | End: 2023-04-27
Attending: EMERGENCY MEDICINE
Payer: MEDICAID

## 2023-04-27 VITALS
OXYGEN SATURATION: 98 % | RESPIRATION RATE: 20 BRPM | HEART RATE: 98 BPM | DIASTOLIC BLOOD PRESSURE: 70 MMHG | SYSTOLIC BLOOD PRESSURE: 128 MMHG | TEMPERATURE: 99 F

## 2023-04-27 VITALS — HEIGHT: 64 IN | WEIGHT: 160.06 LBS

## 2023-04-27 DIAGNOSIS — J02.0 STREPTOCOCCAL PHARYNGITIS: ICD-10-CM

## 2023-04-27 DIAGNOSIS — J02.9 ACUTE PHARYNGITIS, UNSPECIFIED: ICD-10-CM

## 2023-04-27 DIAGNOSIS — Z90.49 ACQUIRED ABSENCE OF OTHER SPECIFIED PARTS OF DIGESTIVE TRACT: Chronic | ICD-10-CM

## 2023-04-27 DIAGNOSIS — R35.0 FREQUENCY OF MICTURITION: ICD-10-CM

## 2023-04-27 DIAGNOSIS — E03.9 HYPOTHYROIDISM, UNSPECIFIED: ICD-10-CM

## 2023-04-27 DIAGNOSIS — R68.83 CHILLS (WITHOUT FEVER): ICD-10-CM

## 2023-04-27 DIAGNOSIS — R10.32 LEFT LOWER QUADRANT PAIN: ICD-10-CM

## 2023-04-27 DIAGNOSIS — Z90.49 ACQUIRED ABSENCE OF OTHER SPECIFIED PARTS OF DIGESTIVE TRACT: ICD-10-CM

## 2023-04-27 LAB
ALBUMIN SERPL ELPH-MCNC: 3.9 G/DL — SIGNIFICANT CHANGE UP (ref 3.3–5)
ALP SERPL-CCNC: 103 U/L — SIGNIFICANT CHANGE UP (ref 40–120)
ALT FLD-CCNC: 35 U/L — SIGNIFICANT CHANGE UP (ref 12–78)
ANION GAP SERPL CALC-SCNC: 2 MMOL/L — LOW (ref 5–17)
APPEARANCE UR: CLEAR — SIGNIFICANT CHANGE UP
AST SERPL-CCNC: 29 U/L — SIGNIFICANT CHANGE UP (ref 15–37)
BACTERIA # UR AUTO: NEGATIVE — SIGNIFICANT CHANGE UP
BASOPHILS # BLD AUTO: 0.05 K/UL — SIGNIFICANT CHANGE UP (ref 0–0.2)
BASOPHILS NFR BLD AUTO: 0.4 % — SIGNIFICANT CHANGE UP (ref 0–2)
BILIRUB SERPL-MCNC: 0.5 MG/DL — SIGNIFICANT CHANGE UP (ref 0.2–1.2)
BILIRUB UR-MCNC: NEGATIVE — SIGNIFICANT CHANGE UP
BUN SERPL-MCNC: 11 MG/DL — SIGNIFICANT CHANGE UP (ref 7–23)
CALCIUM SERPL-MCNC: 9.1 MG/DL — SIGNIFICANT CHANGE UP (ref 8.5–10.1)
CHLORIDE SERPL-SCNC: 107 MMOL/L — SIGNIFICANT CHANGE UP (ref 96–108)
CO2 SERPL-SCNC: 26 MMOL/L — SIGNIFICANT CHANGE UP (ref 22–31)
COLOR SPEC: YELLOW — SIGNIFICANT CHANGE UP
CREAT SERPL-MCNC: 0.69 MG/DL — SIGNIFICANT CHANGE UP (ref 0.5–1.3)
DIFF PNL FLD: ABNORMAL
EGFR: 110 ML/MIN/1.73M2 — SIGNIFICANT CHANGE UP
EOSINOPHIL # BLD AUTO: 0.04 K/UL — SIGNIFICANT CHANGE UP (ref 0–0.5)
EOSINOPHIL NFR BLD AUTO: 0.3 % — SIGNIFICANT CHANGE UP (ref 0–6)
EPI CELLS # UR: SIGNIFICANT CHANGE UP
GLUCOSE SERPL-MCNC: 113 MG/DL — HIGH (ref 70–99)
GLUCOSE UR QL: NEGATIVE — SIGNIFICANT CHANGE UP
HCG SERPL-ACNC: <1 MIU/ML — SIGNIFICANT CHANGE UP
HCT VFR BLD CALC: 39.8 % — SIGNIFICANT CHANGE UP (ref 34.5–45)
HGB BLD-MCNC: 13.3 G/DL — SIGNIFICANT CHANGE UP (ref 11.5–15.5)
IMM GRANULOCYTES NFR BLD AUTO: 0.4 % — SIGNIFICANT CHANGE UP (ref 0–0.9)
KETONES UR-MCNC: NEGATIVE — SIGNIFICANT CHANGE UP
LEUKOCYTE ESTERASE UR-ACNC: NEGATIVE — SIGNIFICANT CHANGE UP
LIDOCAIN IGE QN: 77 U/L — SIGNIFICANT CHANGE UP (ref 73–393)
LYMPHOCYTES # BLD AUTO: 1.06 K/UL — SIGNIFICANT CHANGE UP (ref 1–3.3)
LYMPHOCYTES # BLD AUTO: 7.6 % — LOW (ref 13–44)
MCHC RBC-ENTMCNC: 29.5 PG — SIGNIFICANT CHANGE UP (ref 27–34)
MCHC RBC-ENTMCNC: 33.4 GM/DL — SIGNIFICANT CHANGE UP (ref 32–36)
MCV RBC AUTO: 88.2 FL — SIGNIFICANT CHANGE UP (ref 80–100)
MONOCYTES # BLD AUTO: 0.79 K/UL — SIGNIFICANT CHANGE UP (ref 0–0.9)
MONOCYTES NFR BLD AUTO: 5.7 % — SIGNIFICANT CHANGE UP (ref 2–14)
NEUTROPHILS # BLD AUTO: 11.9 K/UL — HIGH (ref 1.8–7.4)
NEUTROPHILS NFR BLD AUTO: 85.6 % — HIGH (ref 43–77)
NITRITE UR-MCNC: NEGATIVE — SIGNIFICANT CHANGE UP
PH UR: 7 — SIGNIFICANT CHANGE UP (ref 5–8)
PLATELET # BLD AUTO: 205 K/UL — SIGNIFICANT CHANGE UP (ref 150–400)
POTASSIUM SERPL-MCNC: 4.6 MMOL/L — SIGNIFICANT CHANGE UP (ref 3.5–5.3)
POTASSIUM SERPL-SCNC: 4.6 MMOL/L — SIGNIFICANT CHANGE UP (ref 3.5–5.3)
PROT SERPL-MCNC: 7.9 GM/DL — SIGNIFICANT CHANGE UP (ref 6–8.3)
PROT UR-MCNC: NEGATIVE — SIGNIFICANT CHANGE UP
RBC # BLD: 4.51 M/UL — SIGNIFICANT CHANGE UP (ref 3.8–5.2)
RBC # FLD: 13 % — SIGNIFICANT CHANGE UP (ref 10.3–14.5)
RBC CASTS # UR COMP ASSIST: ABNORMAL /HPF (ref 0–4)
S PYO AG SPEC QL IA: POSITIVE
SODIUM SERPL-SCNC: 135 MMOL/L — SIGNIFICANT CHANGE UP (ref 135–145)
SP GR SPEC: 1.01 — SIGNIFICANT CHANGE UP (ref 1.01–1.02)
UROBILINOGEN FLD QL: 4
WBC # BLD: 13.9 K/UL — HIGH (ref 3.8–10.5)
WBC # FLD AUTO: 13.9 K/UL — HIGH (ref 3.8–10.5)
WBC UR QL: SIGNIFICANT CHANGE UP /HPF (ref 0–5)

## 2023-04-27 PROCEDURE — 80053 COMPREHEN METABOLIC PANEL: CPT

## 2023-04-27 PROCEDURE — 85025 COMPLETE CBC W/AUTO DIFF WBC: CPT

## 2023-04-27 PROCEDURE — 87086 URINE CULTURE/COLONY COUNT: CPT

## 2023-04-27 PROCEDURE — 74177 CT ABD & PELVIS W/CONTRAST: CPT | Mod: MA

## 2023-04-27 PROCEDURE — 36415 COLL VENOUS BLD VENIPUNCTURE: CPT

## 2023-04-27 PROCEDURE — 99284 EMERGENCY DEPT VISIT MOD MDM: CPT

## 2023-04-27 PROCEDURE — 99284 EMERGENCY DEPT VISIT MOD MDM: CPT | Mod: 25

## 2023-04-27 PROCEDURE — 83690 ASSAY OF LIPASE: CPT

## 2023-04-27 PROCEDURE — 84702 CHORIONIC GONADOTROPIN TEST: CPT

## 2023-04-27 PROCEDURE — 87880 STREP A ASSAY W/OPTIC: CPT

## 2023-04-27 PROCEDURE — 74177 CT ABD & PELVIS W/CONTRAST: CPT | Mod: 26,MA

## 2023-04-27 PROCEDURE — 96375 TX/PRO/DX INJ NEW DRUG ADDON: CPT

## 2023-04-27 PROCEDURE — 81001 URINALYSIS AUTO W/SCOPE: CPT

## 2023-04-27 PROCEDURE — 96374 THER/PROPH/DIAG INJ IV PUSH: CPT | Mod: XU

## 2023-04-27 RX ORDER — CEFTRIAXONE 500 MG/1
1000 INJECTION, POWDER, FOR SOLUTION INTRAMUSCULAR; INTRAVENOUS ONCE
Refills: 0 | Status: COMPLETED | OUTPATIENT
Start: 2023-04-27 | End: 2023-04-27

## 2023-04-27 RX ORDER — SODIUM CHLORIDE 9 MG/ML
1000 INJECTION INTRAMUSCULAR; INTRAVENOUS; SUBCUTANEOUS ONCE
Refills: 0 | Status: COMPLETED | OUTPATIENT
Start: 2023-04-27 | End: 2023-04-27

## 2023-04-27 RX ORDER — ACETAMINOPHEN 500 MG
1000 TABLET ORAL ONCE
Refills: 0 | Status: COMPLETED | OUTPATIENT
Start: 2023-04-27 | End: 2023-04-27

## 2023-04-27 RX ORDER — CEFTRIAXONE 500 MG/1
1000 INJECTION, POWDER, FOR SOLUTION INTRAMUSCULAR; INTRAVENOUS ONCE
Refills: 0 | Status: DISCONTINUED | OUTPATIENT
Start: 2023-04-27 | End: 2023-04-27

## 2023-04-27 RX ORDER — DEXAMETHASONE 0.5 MG/5ML
6 ELIXIR ORAL ONCE
Refills: 0 | Status: COMPLETED | OUTPATIENT
Start: 2023-04-27 | End: 2023-04-27

## 2023-04-27 RX ADMIN — Medication 400 MILLIGRAM(S): at 17:31

## 2023-04-27 RX ADMIN — SODIUM CHLORIDE 1000 MILLILITER(S): 9 INJECTION INTRAMUSCULAR; INTRAVENOUS; SUBCUTANEOUS at 16:50

## 2023-04-27 RX ADMIN — CEFTRIAXONE 1000 MILLIGRAM(S): 500 INJECTION, POWDER, FOR SOLUTION INTRAMUSCULAR; INTRAVENOUS at 17:31

## 2023-04-27 RX ADMIN — Medication 6 MILLIGRAM(S): at 19:30

## 2023-04-27 RX ADMIN — Medication 1000 MILLIGRAM(S): at 19:22

## 2023-04-27 NOTE — ED STATDOCS - PATIENT PORTAL LINK FT
You can access the FollowMyHealth Patient Portal offered by [facility] by registering at the following website: [email or web address]. By joining [employer]’s FollowMyHealth portal, you will also be able to view your health information using other applications (apps) compatible with our system.

## 2023-04-27 NOTE — ED ADULT NURSE NOTE - CAS EDN DISCHARGE ASSESSMENT
X Size Of Lesion In Cm (Optional): 0 Detail Level: Simple Detail Level: Detailed Size Of Lesion: 3.0 x 2.0 Cm Morphology Per Location (Optional): Pigmented plaque with central pedunculated papule Alert and oriented to person, place and time

## 2023-04-27 NOTE — ED STATDOCS - NS ED ROS FT
Constitutional: +chills  Eyes: No visual changes  HEENT: +throat pain  CV: No chest pain  Resp: No SOB no cough  GI: +abd pain, No nausea or vomiting  : No dysuria, +increased urinary frequency  MSK: No musculoskeletal pain  Skin: No rash  Neuro: No headache

## 2023-04-27 NOTE — ED STATDOCS - OBJECTIVE STATEMENT
42 year old female with PMHx of hypothyroid, h/o gallbladder removal presents to the ED c/o intermittent chills, sore throat, left sided abd pain x yesterday. Pt took an Aleve which provided some temporary relief. Pt notes increased urinary frequency Denies cough, cough, diarrhea. NDKA. No other injuries or complaints.    ID: [id number]

## 2023-04-27 NOTE — ED STATDOCS - GASTROINTESTINAL, MLM
abdomen soft, non-tender, and non-distended. Bowel sounds present. NEG McBurney's point tenderness. NEG Rovsing. NEG psoas. NEG Cameron. No CVAT.

## 2023-04-27 NOTE — ED ADULT TRIAGE NOTE - CHIEF COMPLAINT QUOTE
Pt presents to ER with daughter c/o chills, HA, sore throat and abd pain. Onset of symptoms began 3 days PTA and have been continuous

## 2023-04-27 NOTE — ED STATDOCS - PROGRESS NOTE DETAILS
PA: Patient is a 42 year old female with PMHx of hypothyroid, h/o gallbladder removal who presents to [facility] c/o intermittent chills, sore throat, left sided abd pain since yesterday. Pt took an Aleve which provided some temporary relief. Pt notes increased urinary frequency Denies cough, cough, diarrhea. ~Wayne Lentz PA-C PA note: CT abd NEG. All labwork/radiology results discussed in detail with patient. Patient re-examined and re-evaluated. Patient feels much better at this time. ED evaluation, Diagnosis and management discussed with the patient in detail. Workup results discussed with ED attending, OK to dc home. Close PMD follow up encouraged, aftercare to assist with scheduling appointment ASAP. Strict ED return instructions discussed in detail and patient given the opportunity to ask any questions about their discharge diagnosis and instructions. Patient verbalized understanding. ~ Wayne Lentz PA-C

## 2023-04-27 NOTE — ED STATDOCS - PHYSICAL EXAMINATION
Constitutional: NAD AOx3  Eyes: PERRL EOMI  Head: Normocephalic atraumatic  Mouth: oropharynx bilateral erythematous tonsils and bilateral exudates,   Cardiac: regular rate and rhythm  Resp: Lungs CTAB  GI: Abd s/nd/ LLQ TTP  Neuro: CN2-12 grossly intact, SEQUEIRA x 4  Skin: No visible rashes Attending: Constitutional: NAD AOx3  Eyes: PERRL EOMI  Head: Normocephalic atraumatic  Mouth: oropharynx bilateral erythematous tonsils and bilateral exudates,   Cardiac: regular rate and rhythm  Resp: Lungs CTAB  GI: Abd s/nd/ LLQ TTP  Neuro: CN2-12 grossly intact, SEQUEIRA x 4  Skin: No visible rashes

## 2023-04-27 NOTE — ED STATDOCS - ENMT, MLM
Nasal mucosa clear.  Mouth with normal mucosa. +Erythema in posterior pharynx, +Post nasal drip. +Exudates. Airway is patent.

## 2023-04-27 NOTE — ED STATDOCS - NSFOLLOWUPINSTRUCTIONS_ED_ALL_ED_FT
Faringitis estreptocócica, en adultos  Strep Throat, Adult  La faringitis estreptocócica es janusz infección en la garganta causada por bacterias. Es frecuente suad los meses de frío del año. Afecta principalmente a los niños que tienen entre 5 y 15 años. Sin embargo, las personas de todas las edades pueden contagiarse en cualquier momento del año. La infección se transmite de janusz persona a otra (es contagiosa) a través de la tos, el estornudo o el contacto cercano.    El médico puede usar otras palabras para describir la infección. Cuando la faringitis estreptocócica afecta las amígdalas, se denomina amigdalitis. Cuando afecta la parte posterior de la garganta, se denomina faringitis.    ¿Cuáles son las causas?  Esta afección es provocada por las bacterias Streptococcus pyogenes.    ¿Qué incrementa el riesgo?  Es más probable que sufra esta afección si:  Cuida a niños en edad escolar o está cerca de niños en edad escolar. Los niños son más propensos a contraer faringitis estreptocócica y pueden transmitírsela a otras personas.  Pasa tiempo en lugares en los que hay mucha gente, donde la infección se puede diseminar fácilmente.  Tiene contacto cercano con alguien que tiene faringitis estreptocócica.  ¿Cuáles son los signos o síntomas?  Los síntomas de esta afección incluyen:  Fiebre o escalofríos.  Enrojecimiento, inflamación o dolor de las amígdalas o la garganta.  Dolor o dificultad para tragar.  Manchas delilah o lauryn en las amígdalas o la garganta.  Ganglios dolorosos al tacto en el nish o debajo de la mandíbula.  Mal aliento.  Erupción alexus en todo el cuerpo. Mabel es poco frecuente.  ¿Cómo se diagnostica?  A sample is taken from a person's throat.   Esta afección se diagnostica mediante estudios para detectar la presencia y la cantidad de bacterias que causan la faringitis estreptocócica. Estos son:  Prueba rápida para estreptococos. Le pasan un hisopo por la garganta para extraer janusz muestra y se comprueba la presencia de bacterias. Generalmente, los resultados están listos en cuestión de minutos.  Cultivo de secreciones de la garganta. Le pasan un hisopo por la garganta para extraer janusz muestra. La muestra se coloca en janusz taza que permite que las bacterias se reproduzcan. Generalmente, los resultados están listos en 1 o 2 días.  ¿Cómo se trata?  El tratamiento de esta afección puede incluir:  Medicamentos que destruyen microbios (antibióticos).  Medicamentos que alivian el dolor o la fiebre. Estos incluyen:  Ibuprofeno o acetaminofeno.  Aspirina, solo para las personas mayores de 18 años.  Pastillas para la garganta.  Aerosoles para la garganta.  Siga estas indicaciones en carrillo casa:  Medicamentos    A prescription pill bottle with an example of a pill.  Use los medicamentos de venta odalys y los recetados solamente seth se lo haya indicado el médico.  [employer] el antibiótico seth se lo haya indicado el médico. No deje de guille el antibiótico aunque comience a sentirse mejor.  Comida y bebida    A diet of soft foods, including applesauce, yogurt, ice cream, and a smoothie.  Si tiene dificultad para tragar, intente consumir alimentos blandos hasta que el dolor de garganta mejore.  Selam suficiente líquido seth para mantener la orina de color amarillo pálido.  Para aliviar el dolor, puede consumir lo siguiente:  Líquidos calientes, seth sopa y té.  Líquidos fríos, seth postres helados o helados de agua.  Indicaciones generales    Raquel gárgaras con janusz mezcla de agua y sal 3 o 4 veces al día, o cuando sea necesario. Para preparar la mezcla de agua y sal, disuelva totalmente de ½ a 1 cucharadita (de 3 a 6 g) de sal en 1 taza (237 ml) de agua tibia.  Descanse mucho.  No concurra a la escuela o al trabajo hasta que haya tomado los antibióticos suad 24 horas.  No consuma ningún producto que contenga nicotina o tabaco. Estos productos incluyen cigarrillos, tabaco para mascar y aparatos de vapeo, seth los cigarrillos electrónicos. Si necesita ayuda para dejar de fumar, consulte al médico.  Es carrillo responsabilidad retirar los resultados de la prueba. Consulte al médico o pregunte en el departamento donde se realiza la prueba cuándo estarán listos los resultados.  Concurra a todas las visitas de seguimiento. [place] es importante.  ¿Cómo se ruy?  Washing hands with soap and water.  No comparta alimentos, tazas ni artículos personales que podrían contagiar la infección a otras personas.  Lávese las sukh frecuentemente con agua y jabón suad al menos 20 segundos. Use desinfectante para sukh si no dispone de agua y jabón. Asegúrese de que todas las personas que viven en carrillo casa se laven pete las sukh.  Raquel que también se dolly los estudios los miembros de la [place] que tengan dolor de garganta o fiebre. Pueden necesitar antibióticos si tienen faringitis estreptocócica.  Comuníquese con un médico si:  Tiene hinchazón en el nish que se hace cada vez más esther.  Aparece janusz erupción cutánea, tos o dolor de oídos.  Tose y expectora janusz mucosidad espesa de color katerina o amarillo amarronado, o con aren.  Tiene dolor o molestias que no mejoran con medicamentos.  Los síntomas parecen empeorar.  Tiene fiebre.  Solicite ayuda de inmediato si:  Tiene síntomas nuevos, seth vómitos, dolor de damien intenso, rigidez o dolor en el nish, dolor en el pecho o falta de aire.  Le duele mucho la garganta, babea o tiene cambios en la visión.  Siente que el nish se le hincha o que la piel de sarah beth vikki se vuelve alexus y sensible.  Tiene signos de deshidratación, seth cansancio (fatiga), sequedad en la boca y disminución de la micción.  Comienza a sentir mucho sueño, o no puede despertarse pete.  Las articulaciones están enrojecidas o le duelen.  Estos síntomas pueden representar un problema grave que constituye janusz emergencia. No espere a oscar si los síntomas desaparecen. Solicite atención médica de inmediato. Comuníquese con el servicio de emergencias de carrillo localidad (911 en los [place]). No conduzca por aiyana propios medios hasta el hospital.    Resumen  La faringitis estreptocócica es janusz infección en la garganta causada por las bacterias llamadas Streptococcus pyogenes. La infección se transmite de janusz persona a otra (es contagiosa) a través de la tos, el estornudo o el contacto cercano.  [employer] los medicamentos, incluidos los antibióticos, seth se lo haya indicado el médico. No deje de guille el antibiótico aunque comience a sentirse mejor.  Para evitar que se propaguen los microbios, lávese pete las sukh con agua y jabón. Pídales a otras personas que también lo dolly. No comparta los alimentos, las tazas ni los artículos personales.  Busque ayuda de inmediato si tiene síntomas nuevos, seth vómitos, dolor de damien intenso, rigidez o dolor en el nihs, dolor en el pecho o dificultad para respirar.  Esta información no tiene seth fin reemplazar el consejo del médico. Asegúrese de hacerle al médico cualquier pregunta que tenga.    Dolor abdominal en los adultos  Abdominal Pain, Adult  El dolor en el abdomen (dolor abdominal) puede tener muchas causas. A menudo, el dolor abdominal no es grave y mejora sin tratamiento o con tratamiento en la casa. Sin embargo, a veces el dolor abdominal es grave.    El médico le hará preguntas sobre aiyana antecedentes médicos y le hará un examen físico para tratar de determinar la causa del dolor abdominal.    Siga estas instrucciones en carrillo casa:    Medicamentos    Use los medicamentos de venta odalys y los recetados solamente seth se lo haya indicado el médico.  No tome un laxante a menos que se lo haya indicado el médico.  Instrucciones generales    Controle carrillo afección para detectar cualquier cambio.  Selam suficiente líquido seth para mantener la orina de color amarillo pálido.  Concurra a todas las visitas de seguimiento seth se lo haya indicado el médico. [place]abel es importante.  Comuníquese con un médico si:  El dolor abdominal cambia o empeora.  No tiene apetito o baja de peso sin proponérselo.  Está estreñido o tiene diarrea suad más de 2 o 3 ld.  Tiene dolor cuando orina o defeca.  El dolor abdominal lo despierta de noche.  El dolor empeora con las comidas, después de comer o con determinados alimentos.  Tiene vómitos y no puede retener nada de lo que ingiere.  Tiene fiebre.  Observa aren en la orina.  Solicite ayuda inmediatamente si:  El dolor no desaparece tan pronto seth el médico le dijo que era esperable.  No puede dejar de vomitar.  El dolor se siente solo en zonas del abdomen, seth el lado derecho o la parte inferior izquierda del abdomen. Si se localiza en la vikki derecha, posiblemente podría tratarse de apendicitis.  Las heces son sanguinolentas o de color bia, o de aspecto alquitranado.  Tiene dolor intenso, cólicos o distensión abdominal.  Tiene signos de deshidratación, seth los siguientes:  Orina de color oscuro, muy escasa o falta de orina.  Labios agrietados.  Sequedad de boca.  Ojos hundidos.  Somnolencia.  Debilidad.  Tiene dificultad para respirar o dolor en el pecho.  Resumen  A menudo, el dolor abdominal no es grave y mejora sin tratamiento o con tratamiento en la casa. Sin embargo, a veces el dolor abdominal es grave.  Controle carrillo afección para detectar cualquier cambio.  Use los medicamentos de venta odalys y los recetados solamente seth se lo haya indicado el médico.  Comuníquese con un médico si el dolor abdominal cambia o empeora.  Busque ayuda de inmediato si tiene dolor intenso, cólicos o distensión abdominal.  Esta información no tiene seth fin reemplazar el consejo del médico. Asegúrese de hacerle al médico cualquier pregunta que tenga.

## 2023-04-27 NOTE — ED STATDOCS - CLINICAL SUMMARY MEDICAL DECISION MAKING FREE TEXT BOX
Plan for: Will  treat for strep with antibiotics. will also work up abd pain with labs, IV fluids, urine, CT and reevaluation. Plan for: Will  treat for strep with antibiotics. will also work up abd pain with labs, IV fluids, urine, CT and reevaluation.    PA note: CT abd NEG. All labwork/radiology results discussed in detail with patient. Patient re-examined and re-evaluated. Patient feels much better at this time. ED evaluation, Diagnosis and management discussed with the patient in detail. Workup results discussed with ED attending, OK to [place] home. Close PMD follow up encouraged, aftercare to assist with scheduling appointment ASAP. Strict ED return instructions discussed in detail and patient given the opportunity to ask any questions about their discharge diagnosis and instructions. Patient verbalized understanding. ~ Wayne Lentz PA-C

## 2023-04-29 LAB
CULTURE RESULTS: SIGNIFICANT CHANGE UP
SPECIMEN SOURCE: SIGNIFICANT CHANGE UP

## 2023-06-27 NOTE — ED ADULT NURSE NOTE - NSFALLRSKINDICATORS_ED_ALL_ED
Subjective:       Patient ID: Elle Hernandes is a 73 y.o. male.    Chief Complaint: Pressure Ulcer and Venous Stasis ( R heel pressure ulceration.     RLE / LLE stasis dermatitis.  Wearing home compression wraps as prescribed.   Follow up with md for re-evaluation.  )    HPI  Review of Systems      Objective:      Temp:  [99.2 °F (37.3 °C)]   Pulse:  [70]   Resp:  [18]   BP: (140)/(66)   SpO2:  [95 %]   Physical Exam       Altered Skin Integrity 03/14/23 1208 Right plantar Heel Full thickness tissue loss. Subcutaneous fat may be visible but bone, tendon or muscle are not exposed (Active)   03/14/23 1208   Altered Skin Integrity Present on Admission - Did Patient arrive to the hospital with altered skin?: yes   Side: Right   Orientation: plantar   Location: Heel   Wound Number:    Is this injury device related?:    Primary Wound Type:    Description of Altered Skin Integrity: Full thickness tissue loss. Subcutaneous fat may be visible but bone, tendon or muscle are not exposed   Ankle-Brachial Index:    Pulses: strong doppler pulses   Removal Indication and Assessment:    Wound Outcome:    (Retired) Wound Length (cm):    (Retired) Wound Width (cm):    (Retired) Depth (cm):    Wound Description (Comments):    Removal Indications:    Wound Image   06/27/23 1310   Description of Altered Skin Integrity Full thickness tissue loss. Subcutaneous fat may be visible but bone, tendon or muscle are not exposed 06/27/23 1310   Drainage Amount Moderate 06/27/23 1310   Drainage Characteristics/Odor Serosanguineous 06/27/23 1310   Appearance Pink;Red;Granulating;Tan;Slough;Fibrin 06/27/23 1310   Tissue loss description Full thickness 06/27/23 1310   Red (%), Wound Tissue Color 80 % 06/27/23 1310   Yellow (%), Wound Tissue Color 20 % 06/27/23 1310   Periwound Area Dry 06/27/23 1310   Wound Edges Defined 06/27/23 1310   Wound Length (cm) 3.8 cm 06/27/23 1310   Wound Width (cm) 4.1 cm 06/27/23 1310   Wound Depth (cm) 0.1 cm  06/27/23 1310   Wound Volume (cm^3) 1.558 cm^3 06/27/23 1310   Wound Surface Area (cm^2) 15.58 cm^2 06/27/23 1310   Care Cleansed with:;Antimicrobial agent 06/27/23 1310   Dressing Applied;Sodium chloride impregnated;Gauze;Rolled gauze;Compression wrap 06/27/23 1310   Compression Other (see comments) 06/27/23 1310         Assessment:     Is 3.8 cm x 4.1 cm.  There is some granulation tissue also slough and fibrin.  Area was cleaned and Santyl and Mesalt was applied to the heel.  Right calf was less swollen today.  Tubigrip and circ aid wraps were applied.  Dressings will be changed every other day and patient will return in 1 week for MD visit.    ICD-10-CM ICD-9-CM   1. Neuropathic ulcer of right heel, unspecified ulcer stage  L97.419 707.14   2. Chronic venous insufficiency  I87.2 459.81         Plan:   Tissue pathology and/or culture taken:  [] Yes [x] No   Sharp debridement performed:   [] Yes [x] No   Labs ordered this visit:   [] Yes [x] No   Imaging ordered this visit:   [] Yes [x] No           Orders Placed This Encounter   Procedures    Change dressing     Right heel - Cleanse with NS or Wound cleanser,    Apply Santyl nickel thick to wound base then mesalt to R heel. cover with saline moistened gauze, making sure to tape dressing in place so it doesn't dislodge.   Cover with abd for additional protection if needed,  wrap with kerlix, tape.  Change daily  Apply betadine to any new blistered area on lower legs if they occur.   (No blisters noted today) If occurs, apply daily.  Allow to dry.        May apply moisturizer to dry skin on Both lower legs and allow to absorb before applying Circaid wraps.        Edema control: Tubigrip E to BLE  (Home health to supply circaid wraps and use in conjunction with Tubigrip or with circaid wrap and compression sock. )  Daily dressing changes to R heel Follow-up: 1 week for MD visit   Home Health: [facility]        Follow up in about 1 week (around 7/4/2023) for md  visit .                   no

## 2023-07-06 ENCOUNTER — FORM ENCOUNTER (OUTPATIENT)
Age: 43
End: 2023-07-06

## 2023-08-02 ENCOUNTER — APPOINTMENT (OUTPATIENT)
Dept: CARDIOLOGY | Facility: HOSPITAL | Age: 43
End: 2023-08-02

## 2023-08-22 ENCOUNTER — APPOINTMENT (OUTPATIENT)
Dept: CARDIOLOGY | Facility: HOSPITAL | Age: 43
End: 2023-08-22

## 2023-08-22 ENCOUNTER — OUTPATIENT (OUTPATIENT)
Dept: OUTPATIENT SERVICES | Facility: HOSPITAL | Age: 43
LOS: 1 days | End: 2023-08-22
Payer: SELF-PAY

## 2023-08-22 VITALS
OXYGEN SATURATION: 95 % | HEIGHT: 60 IN | BODY MASS INDEX: 30.63 KG/M2 | WEIGHT: 156 LBS | HEART RATE: 64 BPM | SYSTOLIC BLOOD PRESSURE: 120 MMHG | DIASTOLIC BLOOD PRESSURE: 81 MMHG

## 2023-08-22 DIAGNOSIS — Z90.49 ACQUIRED ABSENCE OF OTHER SPECIFIED PARTS OF DIGESTIVE TRACT: Chronic | ICD-10-CM

## 2023-08-22 DIAGNOSIS — I25.10 ATHEROSCLEROTIC HEART DISEASE OF NATIVE CORONARY ARTERY WITHOUT ANGINA PECTORIS: ICD-10-CM

## 2023-08-22 DIAGNOSIS — R07.89 OTHER CHEST PAIN: ICD-10-CM

## 2023-08-22 PROCEDURE — G0463: CPT

## 2023-08-23 DIAGNOSIS — R07.89 OTHER CHEST PAIN: ICD-10-CM

## 2023-08-29 NOTE — HISTORY OF PRESENT ILLNESS
[FreeTextEntry1] : 44 yo F w/ hx of hypothyroidism, GERD who was referred to cardiology for chest pain.  Pt reports shes been having sharp intermittent stabbing pain over the L chest for the past 4-6 months, that has occurs roughly 1-2x/week, lasting about 2-3 minutes at a time, occurs always rest, doesn't occur exertion, and usually in the evning when she's lying in bed, flat. She reports the pain goes away on its own and she's eating raw garlic which also helps. Of note reports she had prior L shoulder pain that radiated to her chest, worsened when she lifted her L arm. She was told she had inflammation of the muscle which improved w/ time. The chest pain she has now is similar to the radiating pain she had then, except now her should is pain free. The CP now can be triggered by raising her arm.  She denies any CP, palpitations or SOB when walking, running or exerting herself. She has no hx of HTN, HLD, or DM and reports her father had an MI in his late 60s, but otherwise no family hx of cardiac issues. She is a lifetime non-smoker, denies alcohol use and drug use. She works as a [employer] part time. Her only daily med is levothyroxine.

## 2023-08-29 NOTE — ASSESSMENT
[FreeTextEntry1] : 44 yo F w/ hx of hypothyroidism and GERD who was referred to cardiology clinic for chest pain.   #Non-cardaic Chest Pain Pt sx not consistent w/ cardiac chest pain as she has no substernal pain and her pain only comes on at night while lying flat and denies exertional sx. Pt has no risk factors for ischemia, no DM, non-smoker and no family hx. EKG is normal and vitals today are also wnl. CP likely non-cardiac, possible MSK given prior L should issue vs GERD given hx. - would rec for continued eval by PCP for non-cardiac etiologies of pain - discussed possible costochondritis w/ pt vs GERD, willing to try PPI and NSADs prn - no further need for cardiology f/up at this time

## 2023-08-29 NOTE — REVIEW OF SYSTEMS
[Chest Discomfort] : chest discomfort [Negative] : Neurological [SOB] : no shortness of breath [Dyspnea on exertion] : not dyspnea during exertion [Lower Ext Edema] : no extremity edema [Leg Claudication] : no intermittent leg claudication [Palpitations] : no palpitations [Orthopnea] : no orthopnea [PND] : no PND [Syncope] : no syncope

## 2023-08-29 NOTE — REASON FOR VISIT
[Symptom and Test Evaluation] : symptom and test evaluation [[employer] ] : provided by [employer]   [Time Spent: ____ minutes] : Total time spent using  services: [unfilled] minutes. The patient's primary language is not English thus required  services. [[employer]preters_IDNumber] : [id number]

## 2023-10-23 ENCOUNTER — OUTPATIENT (OUTPATIENT)
Dept: OUTPATIENT SERVICES | Facility: HOSPITAL | Age: 43
LOS: 1 days | End: 2023-10-23
Payer: COMMERCIAL

## 2023-10-23 ENCOUNTER — APPOINTMENT (OUTPATIENT)
Dept: MAMMOGRAPHY | Facility: CLINIC | Age: 43
End: 2023-10-23
Payer: COMMERCIAL

## 2023-10-23 ENCOUNTER — APPOINTMENT (OUTPATIENT)
Dept: ULTRASOUND IMAGING | Facility: CLINIC | Age: 43
End: 2023-10-23
Payer: COMMERCIAL

## 2023-10-23 DIAGNOSIS — N92.0 EXCESSIVE AND FREQUENT MENSTRUATION WITH REGULAR CYCLE: ICD-10-CM

## 2023-10-23 DIAGNOSIS — Z90.49 ACQUIRED ABSENCE OF OTHER SPECIFIED PARTS OF DIGESTIVE TRACT: Chronic | ICD-10-CM

## 2023-10-23 DIAGNOSIS — Z01.411 ENCOUNTER FOR GYNECOLOGICAL EXAMINATION (GENERAL) (ROUTINE) WITH ABNORMAL FINDINGS: ICD-10-CM

## 2023-10-23 PROCEDURE — 76830 TRANSVAGINAL US NON-OB: CPT | Mod: 26

## 2023-10-23 PROCEDURE — 77067 SCR MAMMO BI INCL CAD: CPT | Mod: 26

## 2023-10-23 PROCEDURE — 76856 US EXAM PELVIC COMPLETE: CPT | Mod: 26

## 2023-10-23 PROCEDURE — 77063 BREAST TOMOSYNTHESIS BI: CPT | Mod: 26

## 2023-10-23 PROCEDURE — 77067 SCR MAMMO BI INCL CAD: CPT

## 2023-10-23 PROCEDURE — 76830 TRANSVAGINAL US NON-OB: CPT

## 2023-10-23 PROCEDURE — 77063 BREAST TOMOSYNTHESIS BI: CPT

## 2023-10-23 PROCEDURE — 76856 US EXAM PELVIC COMPLETE: CPT

## 2024-01-07 ENCOUNTER — EMERGENCY (EMERGENCY)
Facility: HOSPITAL | Age: 44
LOS: 0 days | Discharge: HOME | End: 2024-01-07
Attending: STUDENT IN AN ORGANIZED HEALTH CARE EDUCATION/TRAINING PROGRAM
Payer: MEDICAID

## 2024-01-07 VITALS — WEIGHT: 160.06 LBS

## 2024-01-07 VITALS
RESPIRATION RATE: 14 BRPM | SYSTOLIC BLOOD PRESSURE: 128 MMHG | OXYGEN SATURATION: 100 % | DIASTOLIC BLOOD PRESSURE: 74 MMHG | HEART RATE: 79 BPM | TEMPERATURE: 98 F

## 2024-01-07 DIAGNOSIS — R42 DIZZINESS AND GIDDINESS: ICD-10-CM

## 2024-01-07 DIAGNOSIS — B97.89 OTHER VIRAL AGENTS AS THE CAUSE OF DISEASES CLASSIFIED ELSEWHERE: ICD-10-CM

## 2024-01-07 DIAGNOSIS — M54.2 CERVICALGIA: ICD-10-CM

## 2024-01-07 DIAGNOSIS — R06.02 SHORTNESS OF BREATH: ICD-10-CM

## 2024-01-07 DIAGNOSIS — R05.9 COUGH, UNSPECIFIED: ICD-10-CM

## 2024-01-07 DIAGNOSIS — R25.2 CRAMP AND SPASM: ICD-10-CM

## 2024-01-07 DIAGNOSIS — Z90.49 ACQUIRED ABSENCE OF OTHER SPECIFIED PARTS OF DIGESTIVE TRACT: Chronic | ICD-10-CM

## 2024-01-07 DIAGNOSIS — E03.9 HYPOTHYROIDISM, UNSPECIFIED: ICD-10-CM

## 2024-01-07 DIAGNOSIS — R07.9 CHEST PAIN, UNSPECIFIED: ICD-10-CM

## 2024-01-07 DIAGNOSIS — J06.9 ACUTE UPPER RESPIRATORY INFECTION, UNSPECIFIED: ICD-10-CM

## 2024-01-07 PROCEDURE — 96372 THER/PROPH/DIAG INJ SC/IM: CPT

## 2024-01-07 PROCEDURE — 99283 EMERGENCY DEPT VISIT LOW MDM: CPT | Mod: 25

## 2024-01-07 PROCEDURE — 99284 EMERGENCY DEPT VISIT MOD MDM: CPT

## 2024-01-07 RX ORDER — KETOROLAC TROMETHAMINE 30 MG/ML
30 SYRINGE (ML) INJECTION ONCE
Refills: 0 | Status: DISCONTINUED | OUTPATIENT
Start: 2024-01-07 | End: 2024-01-07

## 2024-01-07 RX ORDER — LIDOCAINE 4 G/100G
1 CREAM TOPICAL ONCE
Refills: 0 | Status: COMPLETED | OUTPATIENT
Start: 2024-01-07 | End: 2024-01-07

## 2024-01-07 RX ADMIN — Medication 30 MILLIGRAM(S): at 20:20

## 2024-01-07 RX ADMIN — LIDOCAINE 1 PATCH: 4 CREAM TOPICAL at 20:20

## 2024-01-07 RX ADMIN — Medication 200 MILLIGRAM(S): at 20:21

## 2024-01-07 NOTE — ED STATDOCS - PATIENT PORTAL LINK FT
You can access the FollowMyHealth Patient Portal offered by [facility] by registering at the following website: [email or web address]. By joining [employer]’s FollowMyHealth portal, you will also be able to view your health information using other applications (apps) compatible with our system. You can access the FollowMyHealth Patient Portal offered by [facility] by registering at the following website: [email or web address]. By joining [employer]’s FollowMyHealth portal, you will also be able to view your health information using other applications (apps) compatible with our system.

## 2024-01-07 NOTE — ED ADULT TRIAGE NOTE - CHIEF COMPLAINT QUOTE
p/w dizziness and body "shaking" since Friday. cough and chest pain with breathing x1 month. Neck and R shoulder pain worsened with movement.

## 2024-01-07 NOTE — ED STATDOCS - OBJECTIVE STATEMENT
44 y/o female with PMHx of hypothyroidism presents to the ED c/o 1 month of dizziness and cough however today woke up with neck pain and unable to move neck secondary to pain, Pt also notes body aches, chills, chest pain, SOB. Pt notes numerous sick contacts at home. Pt took Tylenol with minimal relief    : language line: [id number] 42 y/o female with PMHx of hypothyroidism presents to the ED c/o 1 month of dizziness and cough however today woke up with neck pain and unable to move neck secondary to pain, Pt also notes body aches, chills, chest pain, SOB. Pt notes numerous sick contacts at home. Pt took Tylenol with minimal relief    : language line: [id number]

## 2024-01-07 NOTE — ED ADULT NURSE NOTE - OBJECTIVE STATEMENT
Pt AOx4 from home c/o neck and right shoulder pain that increases w/ movement and cough. Pt denies fevers, chills, n/v/d, dizziness ot active chest pain.

## 2024-01-07 NOTE — ED STATDOCS - CLINICAL SUMMARY MEDICAL DECISION MAKING FREE TEXT BOX
adult female p/w cough neck pain, viral symptoms, chills, numerous sick contacts at home, VSS, neurologist intact, no upper extremities weakness, no signs of meningitis, well appearing, well hydrated, no acute distress, likely msk strain and viral syndrome. likely d/c with outpatient f/u adult female p/w cough neck pain, viral symptoms, chills, numerous sick contacts at home, VSS, neurologist intact, no upper extremities weakness, no signs of meningitis, well appearing, well hydrated, no acute distress, likely msk strain and viral syndrome. likely d/c with outpatient f/u    signed Regina Pizarro PA-C Pt seen and evaluated initially in intake by Dr Mcknight.  ID [id number]  43F with cough for several weeks, + sick contacts at home. Today pt woke up with neck pain and stiffness. No trauma or fever. +trap spasm on exam. exam otherwise unremarkable. likely muscular spasm and viral uri. toradol and tessalon. rx tessalon. f/u [facility]. return precautions given. Pt feeling well at DC, agrees with DC and plan of care. adult female p/w cough neck pain, viral symptoms, chills, numerous sick contacts at home, VSS, neurologist intact, no upper extremities weakness, no signs of meningitis, well appearing, well hydrated, no acute distress, likely msk strain and viral syndrome. likely d/c with outpatient f/u    signed Regina Pizarro PA-C Pt seen and evaluated initially in intake by Dr Mcknight.  ID [id number]  43F with cough for several weeks, + sick contacts at home. Today pt woke up with neck pain and stiffness. No trauma or fever. +trap spasm on exam. exam otherwise unremarkable. likely muscular spasm and viral uri. toradol and tessalon. rx tessalon. f/u [facility]. return precautions given. Pt feeling well at DC, agrees with DC and plan of care.

## 2024-01-07 NOTE — ED STATDOCS - NSFOLLOWUPINSTRUCTIONS_ED_ALL_ED_FT
SIGUE A TU MÉDICO EN 1-2 DÍAS. REGRESE A LA ER PARA CUALQUIER SÍNTOMA PENDIENTE O NUEVAS PREOCUPACIONES.     Tortícolis espasmódica    LO QUE NECESITA SABER:    ¿Qué es la tortícolis espasmódica?La tortícolis espasmódica, o distonía cervical, es janusz condición que causa que los músculos del nish se contraigan de forma anormal. Cole nish podría torcerse y hacer que cole damien quede inclinada hacia un lado, hacia adelante o hacia atrás. La tortícolis espasmódica puede ocurrir ocasionalmente o de forma continua. Muchas veces empeora con el estrés.    ¿Qué causa la tortícolis espasmódica?La causa la tortícolis espasmódicano es conocida. Esta condición podría presentarse después de sufrir janusz lesión en la columna cervical. También podría ser el resultado de janusz enfermedad que afecta los músculos, los huesos, el sistema nervioso, los ojos o el equilibrio. Usted podría correr mayor riesgo de tener tortícolis espasmódica si colleen de aiyana familiares cercanos tiene esta condición.    ¿Cuáles son otros signos y síntomas de la tortícolis espasmódica?Usted podría tener dificultad para  el nish. También podría tener dolor de damien, dolor en el nish u hombros. Es posible que tenga espasmos, rigidez o inflamación en los músculos del nish.    ¿Cómo se diagnostica la tortícolis espasmódica?Cole médico le examinará la damien y el nish. Es posible que también necesite alguno de los siguientes tratamientos:    Radiografías de la columna cervicalpara comprobar si tiene algún hueso fracturado u otro problema en el nish.    Janusz tomografía computarizada (TC) o janusz imagen por resonancia magnética (IRM)para comprobar si hay algún problema con aiyana huesos, músculos, cerebro o vasos sanguíneos. Le podrían administrar un tinte para ayudar a que las imágenes se vean mejor. Informe al médico si alguna vez ha tenido janusz reacción alérgica a un medio de contraste. No entre a la robinson donde se realiza la resonancia magnética con algo de metal. El metal puede causar lesiones serias. Dígale al médico si usted tiene algo de metal dentro de cole cuerpo o por encima.    Electromiografía(EMG) es janusz prueba de los músculos y los nervios que los controlan. Se colocan electrodos (cables) en el área del músculo afectado. Es posible que se conecten agujas a los electrodos y que se coloquen en cole piel. La actividad eléctrica de los músculos y nervios se mide por janusz máquina que se conecta a los electrodos. Se hace esta prueba cuando los músculos están descansando y cuando están activos.  ¿Cómo se trata la tortícolis espasmódica?    Medicamentos:  Relajantes muscularesalivian el dolor y los espasmos musculares.    Podrían administrarse inyecciones de toxina botulínicatambién se pueden admnistrar para relajar aiyana músculos.    AINEcomo el ibuprofeno, ayudan a disminuir la inflamación, el dolor y la fiebre. Irma medicamento está disponible con o sin janusz receta médica. Los LUPILLO pueden causar sangrado estomacal o problemas renales en ciertas personas. Si usted está tomando un anticoagulante, siempre pregunte si los LUPILLO son seguros para usted. Siempre janie la etiqueta de irma medicamento y siga las instrucciones. No administre irma medicamento a niños menores de 6 meses de luisito sin antes obtener la autorización del médico.    Acetaminofénalivia el dolor. Está disponible sin receta médica. Pregunte la cantidad y la frecuencia con que debe tomarlos. Siga las indicaciones. El acetaminofén puede causar daño en el hígado cuando no se gilberto de forma correcta.    Puede administrarsepodrían administrarse. Pregunte al médico cómo debe guille irma medicamento de forma chen.    La cirugíapueden ser necesarios si los demás tratamientos no mike resultado. Podrían cortar los nervios de los músculos afectados. A veces se cortan o separan los músculos del nish.  ¿Cuándo marlene comunicarme con mi médico?    Tiene fiebre.    Usted tiene el nish inflamado y la inflamación empeora o no mejora.    Usted tiene janusz mayor sensación de tristeza o scott, o se siente deprimido.    Usted tiene preguntas o inquietudes acerca de cole condición o cuidado.  ¿Cuándo marlene buscar atención inmediata o llamar al 911?    Usted siente más dolor en cole nish u hombro.    Usted tiene falta de aire repentina.    Usted tiene dificultad para  los brazos o las piernas.    Usted siente entumecidos los brazos o las piernas.  ACUERDOS SOBRE COLE CUIDADO:    Usted tiene el derecho de ayudar a planear cole cuidado. Aprenda todo lo que pueda sobre cole condición y seth darle tratamiento. Discuta aiyana opciones de tratamiento con aiyana médicos para decidir el cuidado que usted desea recibir. Usted siempre tiene el derecho de rechazar el tratamiento.    Enfermedades virales en los adultos  (Viral Illness, Adult)  Los virus son microbios diminutos que entran en el organismo de janusz persona y causan enfermedades. Hay muchos tipos de virus diferentes y causan muchas clases de enfermedades. Las enfermedades virales pueden ser leves o graves. Pueden afectar diferentes partes del cuerpo.  Las enfermedades frecuentes causadas por virus incluyen los resfríos y la gripe. Además, las enfermedades virales abarcan ashley clínicos graves, seth el VIH/sida (virus de inmunodeficiencia humana/síndrome de inmunodeficiencia adquirida). Se hull identificado unos pocos virus asociados con determinados tipos de cáncer.  ¿CUÁLES SON LAS CAUSAS?  Muchos tipos de virus pueden causar enfermedades. Los virus invaden las células del organismo, se multiplican y provocan la disfunción o la muerte de las células infectadas. Cuando la célula muere, libera más virus. Cuando esto ocurre, aparecen síntomas de la enfermedad, y el virus sigue diseminándose a otras células. Si el virus asume la función de la célula, puede hacer que esta se divida y crezca fuera de control, y irma es el ladan en el que un virus causa cáncer.  Los diferentes virus ingresan al organismo de distintas formas. Puede contraer un virus de la siguiente manera:  Al ingerir alimentos o beber agua contaminados con el virus.Al inhalar gotitas que janusz persona infectada liberó en el aire al toser o estornudar.Al tocar janusz superficie contaminada con el virus y luego llevarse la mano a la boca, la nariz o los ojos.Al ser toña por un insecto o mordido por un animal que son portadores del virus.Al tener contacto sexual con janusz persona infectada por el virus.Al tener contacto con aren o líquidos que contienen el virus, ya sea a través de un samia abierto o suad janusz transfusión.Si el virus ingresa al organismo, el sistema de defensa del cuerpo (sistema inmunitario) intentará combatirlo. Puede correr un riesgo más alto de tener janusz enfermedad viral si tiene el sistema inmunitario debilitado.  ¿CUÁLES SON LOS SIGNOS O LOS SÍNTOMAS?  Los síntomas varían en función del tipo de virus y de la ubicación de las células que irma invade. Los síntomas frecuentes de los principales tipos de enfermedades virales incluyen los siguientes:  Virus del resfrío y de la gripe   Fiebre.Dolor de damien.Dolor de garganta.Myla musculares.Congestión nasal.Tos.Virus del aparato digestivo (gastrointestinales)  Fiebre.Dolor abdominal.Náuseas.Diarrea.Virus hepáticos (hepatitis)   Pérdida del apetito.Cansancio.Guy amarillento de la piel (ictericia).Virus del cerebro y la médula croft   Fiebre.Dolor de damien.Rigidez en el nish.Náuseas y vómitos.Confusión o somnolencia.Virus de la piel   Verrugas.Picazón.Erupción cutánea.Virus de transmisión sexual   Secreción.Hinchazón.Enrojecimiento.Erupción cutánea.¿CÓMO SE TRATA ESTA AFECCIÓN?  Los virus pueden ser difíciles de tratar porque se hospedan en las células. Los antibióticos no tratan los virus porque no llegan al interior de las células. El tratamiento de janusz enfermedad viral puede incluir lo siguiente:  Descansar y beber abundantes líquidos.Medicamentos para aliviar los síntomas. Estos pueden incluir medicamentos de venta odalys para el dolor y la fiebre, medicamentos para la tos o la congestión y medicamentos para aliviar la diarrea.Medicamentos antivirales. Estos fármacos están disponibles únicamente para determinados tipos de virus. Pueden ayudar a aliviar los síntomas de la gripe, si se patti apenas comienza el cuadro. También hay antivirales para la hepatitis y el VIH/sida.Algunas enfermedades virales pueden evitarse con vacunas. Un ejemplo frecuente es la vacuna antigripal.  SIGA ESTAS INDICACIONES EN COLE CASA:  Medicamentos   [place] los medicamentos de venta odalys y los recetados solamente seth se lo haya indicado el médico.Si le recetaron un antiviral, tómelo seth se lo haya indicado el médico. No deje de guille los medicamentos aunque comience a sentirse mejor.Infórmese sobre cuándo los antibióticos son necesarios y cuándo no. Los antibióticos no combaten a los virus. Si el médico rhianna que usted puede tener janusz infección bacteriana así seth janusz viral, makenna vez le receten un antibiótico.  No solicite janusz receta de antibióticos si le hull diagnosticado janusz enfermedad viral. Eso no hará que la enfermedad pase más rápidamente.Guille antibióticos con frecuencia cuando no son necesarios puede derivar en resistencia a los antibióticos. Cuando esto ocurre, el medicamento pierde cole eficacia contra las bacterias que normalmente combate.Instrucciones generales   Selam suficiente líquido para mantener la orina charlene o de color amarillo pálido.Descanse todo lo que pueda.Retome aiyana actividades normales seth se lo haya indicado el médico. Pregúntele al médico qué actividades son seguras para usted.Concurra a todas las visitas de control seth se lo haya indicado el médico. [place] es importante.¿CÓMO SE SUE ESTO?  [place] estas medidas para reducir el riesgo de tener janusz infección viral:  Consuma janusz dieta doretha y descanse mucho.Lávese las sukh frecuentemente con agua y jabón. [place] es especialmente importante cuando está en lugares públicos. Use desinfectante para sukh si no dispone de agua y jabón.Evite el contacto cercano con amigos y familiares que tengan janusz infección viral.Si viaja a las regiones donde las infecciones virales gastrointestinales son frecuentes, no tome agua ni coma alimentos crudos.Manténgase al día con las vacunas. Vacúnese contra la gripe todos los años seth se lo haya indicado el médico.No comparta cepillos de dientes, cortaúñas, rasuradoras o agujas con otras personas.Siempre tenga sexo con protección.COMUNÍQUESE CON UN MÉDICO SI:  Tiene síntomas de janusz enfermedad viral que no desaparecen.Los síntomas regresan después de azael desaparecido.Los síntomas empeoran.SOLICITE AYUDA DE INMEDIATO SI:  Tiene dificultad para respirar.Siente un dolor de damien intenso o rigidez en el nish.Tiene vómitos teresa o dolor abdominal.Esta información no tiene seth fin reemplazar el consejo del médico. Asegúrese de hacerle al médico cualquier pregunta que tenga. SIGUE A TU MÉDICO EN 1-2 DÍAS. REGRESE A LA ER PARA CUALQUIER SÍNTOMA PENDIENTE O NUEVAS PREOCUPACIONES.     Tortícolis espasmódica    LO QUE NECESITA SABER:    ¿Qué es la tortícolis espasmódica?La tortícolis espasmódica, o distonía cervical, es janusz condición que causa que los músculos del nish se contraigan de forma anormal. Cole nish podría torcerse y hacer que cole damien quede inclinada hacia un lado, hacia adelante o hacia atrás. La tortícolis espasmódica puede ocurrir ocasionalmente o de forma continua. Muchas veces empeora con el estrés.    ¿Qué causa la tortícolis espasmódica?La causa la tortícolis espasmódicano es conocida. Esta condición podría presentarse después de sufrir janusz lesión en la columna cervical. También podría ser el resultado de janusz enfermedad que afecta los músculos, los huesos, el sistema nervioso, los ojos o el equilibrio. Usted podría correr mayor riesgo de tener tortícolis espasmódica si colleen de aiyana familiares cercanos tiene esta condición.    ¿Cuáles son otros signos y síntomas de la tortícolis espasmódica?Usted podría tener dificultad para  el nish. También podría tener dolor de damien, dolor en el nish u hombros. Es posible que tenga espasmos, rigidez o inflamación en los músculos del nish.    ¿Cómo se diagnostica la tortícolis espasmódica?Cole médico le examinará la damien y el nish. Es posible que también necesite alguno de los siguientes tratamientos:    Radiografías de la columna cervicalpara comprobar si tiene algún hueso fracturado u otro problema en el nish.    Janusz tomografía computarizada (TC) o janusz imagen por resonancia magnética (IRM)para comprobar si hay algún problema con aiyana huesos, músculos, cerebro o vasos sanguíneos. Le podrían administrar un tinte para ayudar a que las imágenes se vean mejor. Informe al médico si alguna vez ha tenido janusz reacción alérgica a un medio de contraste. No entre a la robinson donde se realiza la resonancia magnética con algo de metal. El metal puede causar lesiones serias. Dígale al médico si usted tiene algo de metal dentro de cole cuerpo o por encima.    Electromiografía(EMG) es janusz prueba de los músculos y los nervios que los controlan. Se colocan electrodos (cables) en el área del músculo afectado. Es posible que se conecten agujas a los electrodos y que se coloquen en cole piel. La actividad eléctrica de los músculos y nervios se mide por janusz máquina que se conecta a los electrodos. Se hace esta prueba cuando los músculos están descansando y cuando están activos.  ¿Cómo se trata la tortícolis espasmódica?    Medicamentos:  Relajantes muscularesalivian el dolor y los espasmos musculares.    Podrían administrarse inyecciones de toxina botulínicatambién se pueden admnistrar para relajar aiyana músculos.    AINEcomo el ibuprofeno, ayudan a disminuir la inflamación, el dolor y la fiebre. Ir[place] medicamento está disponible con o sin janusz receta médica. Los LUPILLO pueden causar sangrado estomacal o problemas renales en ciertas personas. Si usted está tomando un anticoagulante, siempre pregunte si los LUPILLO son seguros para usted. Siempre janie la etiqueta de irma medicamento y siga las instrucciones. No administre irma medicamento a niños menores de 6 meses de luisito sin antes obtener la autorización del médico.    Acetaminofénalivia el dolor. Está disponible sin receta médica. Pregunte la cantidad y la frecuencia con que debe tomarlos. Siga las indicaciones. El acetaminofén puede causar daño en el hígado cuando no se gilberto de forma correcta.    Puede administrarsepodrían administrarse. Pregunte al médico cómo debe guille irma medicamento de forma chen.    La cirugíapueden ser necesarios si los demás tratamientos no mike resultado. Podrían cortar los nervios de los músculos afectados. A veces se cortan o separan los músculos del nish.  ¿Cuándo marlene comunicarme con mi médico?    Tiene fiebre.    Usted tiene el nish inflamado y la inflamación empeora o no mejora.    Usted tiene janusz mayor sensación de tristeza o scott, o se siente deprimido.    Usted tiene preguntas o inquietudes acerca de cole condición o cuidado.  ¿Cuándo marlene buscar atención inmediata o llamar al 911?    Usted siente más dolor en cole nish u hombro.    Usted tiene falta de aire repentina.    Usted tiene dificultad para  los brazos o las piernas.    Usted siente entumecidos los brazos o las piernas.  ACUERDOS SOBRE COLE CUIDADO:    Usted tiene el derecho de ayudar a planear cole cuidado. Aprenda todo lo que pueda sobre cole condición y seth darle tratamiento. Discuta aiyana opciones de tratamiento con aiyana médicos para decidir el cuidado que usted desea recibir. Usted siempre tiene el derecho de rechazar el tratamiento.    Enfermedades virales en los adultos  (Viral Illness, Adult)  Los virus son microbios diminutos que entran en el organismo de janusz persona y causan enfermedades. Hay muchos tipos de virus diferentes y causan muchas clases de enfermedades. Las enfermedades virales pueden ser leves o graves. Pueden afectar diferentes partes del cuerpo.  Las enfermedades frecuentes causadas por virus incluyen los resfríos y la gripe. Además, las enfermedades virales abarcan ashley clínicos graves, seth el VIH/sida (virus de inmunodeficiencia humana/síndrome de inmunodeficiencia adquirida). Se hull identificado unos pocos virus asociados con determinados tipos de cáncer.  ¿CUÁLES SON LAS CAUSAS?  Muchos tipos de virus pueden causar enfermedades. Los virus invaden las células del organismo, se multiplican y provocan la disfunción o la muerte de las células infectadas. Cuando la célula muere, libera más virus. Cuando esto ocurre, aparecen síntomas de la enfermedad, y el virus sigue diseminándose a otras células. Si el virus asume la función de la célula, puede hacer que esta se divida y crezca fuera de control, y irma es el ladan en el que un virus causa cáncer.  Los diferentes virus ingresan al organismo de distintas formas. Puede contraer un virus de la siguiente manera:  Al ingerir alimentos o beber agua contaminados con el virus.Al inhalar gotitas que janusz persona infectada liberó en el aire al toser o estornudar.Al tocar janusz superficie contaminada con el virus y luego llevarse la mano a la boca, la nariz o los ojos.Al ser toña por un insecto o mordido por un animal que son portadores del virus.Al tener contacto sexual con janusz persona infectada por el virus.Al tener contacto con aren o líquidos que contienen el virus, ya sea a través de un samia abierto o suad janusz transfusión.Si el virus ingresa al organismo, el sistema de defensa del cuerpo (sistema inmunitario) intentará combatirlo. Puede correr un riesgo más alto de tener janusz enfermedad viral si tiene el sistema inmunitario debilitado.  ¿CUÁLES SON LOS SIGNOS O LOS SÍNTOMAS?  Los síntomas varían en función del tipo de virus y de la ubicación de las células que irma invade. Los síntomas frecuentes de los principales tipos de enfermedades virales incluyen los siguientes:  Virus del resfrío y de la gripe   Fiebre.Dolor de damien.Dolor de garganta.Myla musculares.Congestión nasal.Tos.Virus del aparato digestivo (gastrointestinales)  Fiebre.Dolor abdominal.Náuseas.Diarrea.Virus hepáticos (hepatitis)   Pérdida del apetito.Cansancio.Guy amarillento de la piel (ictericia).Virus del cerebro y la médula croft   Fiebre.Dolor de damien.Rigidez en el nish.Náuseas y vómitos.Confusión o somnolencia.Virus de la piel   Verrugas.Picazón.Erupción cutánea.Virus de transmisión sexual   Secreción.Hinchazón.Enrojecimiento.Erupción cutánea.¿CÓMO SE TRATA ESTA AFECCIÓN?  Los virus pueden ser difíciles de tratar porque se hospedan en las células. Los antibióticos no tratan los virus porque no llegan al interior de las células. El tratamiento de janusz enfermedad viral puede incluir lo siguiente:  Descansar y beber abundantes líquidos.Medicamentos para aliviar los síntomas. Estos pueden incluir medicamentos de venta odalys para el dolor y la fiebre, medicamentos para la tos o la congestión y medicamentos para aliviar la diarrea.Medicamentos antivirales. Estos fármacos están disponibles únicamente para determinados tipos de virus. Pueden ayudar a aliviar los síntomas de la gripe, si se patti apenas comienza el cuadro. También hay antivirales para la hepatitis y el VIH/sida.Algunas enfermedades virales pueden evitarse con vacunas. Un ejemplo frecuente es la vacuna antigripal.  SIGA ESTAS INDICACIONES EN COLE CASA:  Medicamentos   [place] los medicamentos de venta odalys y los recetados solamente seth se lo haya indicado el médico.Si le recetaron un antiviral, tómelo seth se lo haya indicado el médico. No deje de guille los medicamentos aunque comience a sentirse mejor.Infórmese sobre cuándo los antibióticos son necesarios y cuándo no. Los antibióticos no combaten a los virus. Si el médico rhianna que usted puede tener janusz infección bacteriana así seth janusz viral, makenna vez le receten un antibiótico.  No solicite janusz receta de antibióticos si le hull diagnosticado janusz enfermedad viral. Eso no hará que la enfermedad pase más rápidamente.Guille antibióticos con frecuencia cuando no son necesarios puede derivar en resistencia a los antibióticos. Cuando esto ocurre, el medicamento pierde cole eficacia contra las bacterias que normalmente combate.Instrucciones generales   Selam suficiente líquido para mantener la orina charlene o de color amarillo pálido.Descanse todo lo que pueda.Retome aiyana actividades normales seth se lo haya indicado el médico. Pregúntele al médico qué actividades son seguras para usted.Concurra a todas las visitas de control seth se lo haya indicado el médico. [place] es importante.¿CÓMO SE SUE ESTO?  [place] estas medidas para reducir el riesgo de tener januzs infección viral:  Consuma janusz dieta doretha y descanse mucho.Lávese las sukh frecuentemente con agua y jabón. [place] es especialmente importante cuando está en lugares públicos. Use desinfectante para sukh si no dispone de agua y jabón.Evite el contacto cercano con amigos y familiares que tengan janusz infección viral.Si viaja a las regiones donde las infecciones virales gastrointestinales son frecuentes, no tome agua ni coma alimentos crudos.Manténgase al día con las vacunas. Vacúnese contra la gripe todos los años seth se lo haya indicado el médico.No comparta cepillos de dientes, cortaúñas, rasuradoras o agujas con otras personas.Siempre tenga sexo con protección.COMUNÍQUESE CON UN MÉDICO SI:  Tiene síntomas de janusz enfermedad viral que no desaparecen.Los síntomas regresan después de azael desaparecido.Los síntomas empeoran.SOLICITE AYUDA DE INMEDIATO SI:  Tiene dificultad para respirar.Siente un dolor de damien intenso o rigidez en el nish.Tiene vómitos teresa o dolor abdominal.Esta información no tiene seth fin reemplazar el consejo del médico. Asegúrese de hacerle al médico cualquier pregunta que tenga.

## 2024-01-07 NOTE — ED ADULT NURSE NOTE - NSFALLUNIVINTERV_ED_ALL_ED
Bed/Stretcher in lowest position, wheels locked, appropriate side rails in place/Call bell, personal items and telephone in reach/Instruct patient to call for assistance before getting out of bed/chair/stretcher/Non-slip footwear applied when patient is off stretcher/[place] to call system/Physically safe environment - no spills, clutter or unnecessary equipment/Purposeful proactive rounding/Room/bathroom lighting operational, light cord in reach Bed/Stretcher in lowest position, wheels locked, appropriate side rails in place/Call bell, personal items and telephone in reach/Instruct patient to call for assistance before getting out of bed/chair/stretcher/Non-slip footwear applied when patient is off stretcher/[place] to call system/Physically safe environment - no spills, clutter or unnecessary equipment/Purposeful proactive rounding/Room/bathroom lighting operational, light cord in reach

## 2024-01-07 NOTE — ED STATDOCS - NSFOLLOWUPCLINICS_GEN_ALL_ED_FT
[facility]  Family Medicine  [address] [place] [address]  Phone: [phone]  Fax:      [facility]  Family Medicine  [address] [place] [address]  Phone: [phone]  Fax:

## 2024-03-25 ENCOUNTER — RESULT REVIEW (OUTPATIENT)
Age: 44
End: 2024-03-25

## 2024-03-25 ENCOUNTER — OUTPATIENT (OUTPATIENT)
Dept: OUTPATIENT SERVICES | Facility: HOSPITAL | Age: 44
LOS: 1 days | End: 2024-03-25
Payer: COMMERCIAL

## 2024-03-25 ENCOUNTER — APPOINTMENT (OUTPATIENT)
Dept: ULTRASOUND IMAGING | Facility: CLINIC | Age: 44
End: 2024-03-25
Payer: COMMERCIAL

## 2024-03-25 DIAGNOSIS — Z90.49 ACQUIRED ABSENCE OF OTHER SPECIFIED PARTS OF DIGESTIVE TRACT: Chronic | ICD-10-CM

## 2024-03-25 DIAGNOSIS — R10.9 UNSPECIFIED ABDOMINAL PAIN: ICD-10-CM

## 2024-03-25 PROCEDURE — 76700 US EXAM ABDOM COMPLETE: CPT | Mod: 26

## 2024-03-25 PROCEDURE — 76700 US EXAM ABDOM COMPLETE: CPT

## 2024-04-06 ENCOUNTER — EMERGENCY (EMERGENCY)
Facility: HOSPITAL | Age: 44
LOS: 0 days | Discharge: HOME | End: 2024-04-06
Attending: STUDENT IN AN ORGANIZED HEALTH CARE EDUCATION/TRAINING PROGRAM
Payer: MEDICAID

## 2024-04-06 VITALS — WEIGHT: 156.09 LBS

## 2024-04-06 VITALS
TEMPERATURE: 98 F | RESPIRATION RATE: 19 BRPM | SYSTOLIC BLOOD PRESSURE: 112 MMHG | HEART RATE: 79 BPM | OXYGEN SATURATION: 100 % | DIASTOLIC BLOOD PRESSURE: 72 MMHG

## 2024-04-06 DIAGNOSIS — R05.9 COUGH, UNSPECIFIED: ICD-10-CM

## 2024-04-06 DIAGNOSIS — R52 PAIN, UNSPECIFIED: ICD-10-CM

## 2024-04-06 DIAGNOSIS — B30.9 VIRAL CONJUNCTIVITIS, UNSPECIFIED: ICD-10-CM

## 2024-04-06 DIAGNOSIS — R06.2 WHEEZING: ICD-10-CM

## 2024-04-06 DIAGNOSIS — Z90.49 ACQUIRED ABSENCE OF OTHER SPECIFIED PARTS OF DIGESTIVE TRACT: Chronic | ICD-10-CM

## 2024-04-06 DIAGNOSIS — E03.9 HYPOTHYROIDISM, UNSPECIFIED: ICD-10-CM

## 2024-04-06 DIAGNOSIS — Z20.822 CONTACT WITH AND (SUSPECTED) EXPOSURE TO COVID-19: ICD-10-CM

## 2024-04-06 LAB
FLUAV AG NPH QL: SIGNIFICANT CHANGE UP
FLUBV AG NPH QL: SIGNIFICANT CHANGE UP
RSV RNA NPH QL NAA+NON-PROBE: SIGNIFICANT CHANGE UP
SARS-COV-2 RNA SPEC QL NAA+PROBE: SIGNIFICANT CHANGE UP

## 2024-04-06 PROCEDURE — 99283 EMERGENCY DEPT VISIT LOW MDM: CPT | Mod: 25

## 2024-04-06 PROCEDURE — 71046 X-RAY EXAM CHEST 2 VIEWS: CPT | Mod: 26

## 2024-04-06 PROCEDURE — 99284 EMERGENCY DEPT VISIT MOD MDM: CPT

## 2024-04-06 PROCEDURE — 71046 X-RAY EXAM CHEST 2 VIEWS: CPT

## 2024-04-06 PROCEDURE — 0241U: CPT

## 2024-04-06 RX ORDER — ACETAMINOPHEN 500 MG
650 TABLET ORAL ONCE
Refills: 0 | Status: COMPLETED | OUTPATIENT
Start: 2024-04-06 | End: 2024-04-06

## 2024-04-06 RX ADMIN — Medication 325 MILLIGRAM(S): at 20:53

## 2024-04-06 NOTE — ED STATDOCS - ATTENDING APP SHARED VISIT CONTRIBUTION OF CARE
I, Shirley Ty DO,  performed the initial face to face bedside interview with this patient regarding history of present illness, review of symptoms and relevant past medical, social and family history.  I completed an independent physical examination.  I was the initial provider who evaluated this patient.   I personally saw the patient and performed a substantive portion of the visit including all aspects of the medical decision making.  I have signed out the follow up of any pending tests (i.e. labs, radiological studies) to the [facility].  I have communicated the patient’s plan of care and disposition with the [facility].  The history, relevant review of systems, past medical and surgical history, medical decision making, and physical examination was documented by the scribe in my presence and I attest to the accuracy of the documentation.

## 2024-04-06 NOTE — ED STATDOCS - PROGRESS NOTE DETAILS
44 y/o F with PMH of hypothyroid presents bilateral eye itching x 3 days. Pt also reports 3-4 weeks of productive cough, intermittent wheezing, body aches. States she saw Dr. Santiago for symptoms at onset, but has not followed up. PE: Well appearing. HEENT: +bilateral conjunctival injection. PERRLA, EOMI. No oropharyngeal erythema. Cardiac: s1s2, RRR. lungs: CTAB. Abdomen: NBS x4, soft, nontender. A/P: Conjunctivitis, likely viral. Will obtain CXR to r/o deric [facility] home. - Markus Ramos PA-C

## 2024-04-06 NOTE — ED STATDOCS - NSFOLLOWUPINSTRUCTIONS_ED_ALL_ED_FT
POR FAVOR, UTILICE LÁGRIMAS ARTIFICIALES PARA EL MANEJO DE LOS SÍNTOMAS, QUE ESTÁN DISPONIBLES SIN RECETA EN CUALQUIER FARMACIA. POR FAVOR, KASSIDY UN SEGUIMIENTO CON CARRILLO PCP PARA JANUSZ EVALUACIÓN ADICIONAL.    PLEASE USE ARTIFICIAL TEARS FOR MANAGEMENT OF SYMPTOMS WHICH IS AVAILABLE OVER THE COUNTER AT ANY PHARMACY. PLEASE FOLLOW UP WITH YOUR PCP FOR FURTHER ASSESSMENT.       Conjuntivitis viral en los adultos  Viral Conjunctivitis, Adult  A normal eye compared to a reddened eye affected by conjunctivitis.  La conjuntivitis viral es la inflamación de la conjuntiva. La conjuntiva es la membrana transparente que cubre la parte lana del monet y la pamela interna del párpado. La inflamación es causada por janusz infección viral. Los vasos sanguíneos de la conjuntiva se agrandan, el monet se vuelve de color solano o marie, y a menudo puede picar y lagrimear. Por lo general, la inflamación comienza en un monet y se extiende al otro tras colleen o dos días. Las infecciones generalmente desaparecen en el transcurso de 1 a 2 semanas.    La conjuntivitis viral es contagiosa. [place] significa que puede transmitirse fácilmente de janusz persona a otra. A menudo, a esta afección se la denomina monet marie.    ¿Cuáles son las causas?  La causa de esta afección es un virus. Puede propagarse al tocar objetos contaminados con el virus, seth las manijas de las johnny o las toallas, y luego tocarse el monet. También se puede transmitir a través de gotitas diminutas, al toser o estornudar.    ¿Qué incrementa el riesgo?  Es más probable que presente esta afección si tiene un resfrío o gripe, o si está en contacto directo con janusz persona que tiene conjuntivitis.    ¿Cuáles son los signos o síntomas?  Los síntomas de esta afección incluyen:  Enrojecimiento en el monet.  Lagrimeo u ojos llorosos.  Irritación y picazón en los ojos.  Sensación de ardor en los ojos.  Secreción transparente de los ojos.  Hinchazón de los párpados.  Sensación de tener arena en los ojos.  Sensibilidad a la zuleyma.  Esta afección suele estar acompañada de otros síntomas, seth congestión nasal, tos y fiebre.    ¿Cómo se diagnostica?  Esta afección se diagnostica mediante janusz revisión de los antecedentes médicos y un examen físico. Si tiene secreción en el monet, esta se puede analizar para detectar si hay un virus presente o para descartar otras causas de conjuntivitis.    ¿Cómo se trata?  La conjuntivitis viral no responde a los medicamentos que eliminan bacterias (antibióticos). Por lo general, la afección desaparece sin tratamiento en el transcurso de 1 a 2 semanas. Si se necesita tratamiento, irma apunta a aliviar los síntomas y prevenir la propagación de la infección. Irma se puede realizar con lágrimas artificiales en gotas, antihistamínicos en gotas u otros medicamentos para los ojos. En contadas ocasiones, se pueden recetar gotas con corticoesteroides o antivirales para el herpes.    Siga estas instrucciones en carrillo casa:  Medicamentos    A tube of ointment.  [place] o aplíquese los medicamentos de venta odalys y los recetados solamente seth se lo haya indicado el médico.  No toque el borde del párpado con el frasco de las gotas oftálmicas ni con el tubo de la pomada cuando aplique los medicamentos en el monet afectado. [place] evitará que la infección se propague al otro monet o a otras personas.  Cuidado de los ojos    Evite tocarse o frotarse los ojos.  Aplíquese un paño limpio, frío y húmedo en el monet suad 10 a 20 minutos, 3 o 4 veces al día o seth se lo haya indicado el médico.  Si usa lentes de contacto, nolos use hasta que haya desaparecido la inflamación y carrillo médico le indique que es seguro usarlos nuevamente. Pregunte a carrillo médico cómo desinfectar o reemplazar aiyana lentes de contacto antes de usarlos nuevamente. Use anteojos hasta que pueda volver a usar los lentes de contacto.  Evite usar maquillaje en los ojos hasta que la inflamación se haya lino. Descarte cosméticos viejos para los ojos que puedan estar contaminados.  Si se formaron costras en el monet, retírelas suavemente con un paño húmedo o con janusz torunda de algodón.  Instrucciones generales    Cambie o lave la josie de la almohada todos los días o seth se lo haya indicado el médico.  No comparta toallas, fundas de almohadas, toallitas para la pamela, maquillaje para los ojos, brochas de maquillaje, gotas para los ojos, lentes de contacto ni anteojos. [place] puede propagar la infección.  Lávese las sukh frecuentemente con agua y jabón. Use toallas de papel para secarse las sukh. Use desinfectante para sukh si no dispone de agua y jabón.  Evite el contacto con otras personas hasta que el monet ya no esté solano y con lágrimas, o seth se lo haya indicado el médico.  Concurra a todas las visitas de seguimiento.  Comuníquese con un médico si:  Los síntomas empeoran o no mejoran con el tratamiento.  Siente un dolor cada vez más intenso.  La visión se vuelve borrosa.  Tiene fiebre.  Siente dolor u observa hinchazón o enrojecimiento en la pamela.  Le sale janusz secreción amarilla o katerina del monet.  Aparecen nuevos síntomas.  Solicite ayuda de inmediato si:  Siente dolor intenso.  La visión empeora mucho.  Resumen  La conjuntivitis viral es la inflamación de la conjuntiva. Suele desaparecer en el término de 1 a 2 semanas.  Esta afección es provocada por un virus y se propaga por el contacto con objetos contaminados o por respirar gotas provenientes de tos o estornudos.  Por lo general, esta afección se trata con medicamentos y compresas frías para aliviar los síntomas. Debido a que es causada por un virus, no debe tratarse con antibióticos.  Esta afección es muy contagiosa. Para prevenir janusz infección, evite el contacto cercano con otras personas, lávese las sukh con frecuencia y no comparta toallas ni toallitas para la pamela.  Comuníquese con un médico si los síntomas no desaparecen con el tratamiento, o si tiene visión borrosa, hinchazón en la pamela o un aumento del dolor.  Esta información no tiene seth fin reemplazar el consejo del médico. Asegúrese de hacerle al médico cualquier pregunta que tenga.

## 2024-04-06 NOTE — ED ADULT TRIAGE NOTE - GLASGOW COMA SCALE: SCORE, MLM
Pt report to [facility] at [facility] ER - [facility] called for pt transport.  pts mom and dad bedside and aware of plan to transfer to [facility] no questions at this time.    15

## 2024-04-06 NOTE — ED STATDOCS - PATIENT PORTAL LINK FT
You can access the FollowMyHealth Patient Portal offered by [facility] by registering at the following website: [email or web address]. By joining [employer]’s FollowMyHealth portal, you will also be able to view your health information using other applications (apps) compatible with our system.

## 2024-04-06 NOTE — ED STATDOCS - PHYSICAL EXAMINATION
Constitutional: well appearing, NAD AAOx3  Eyes: EOMI, PERRL, B/L conjunctival injection without discharge  Head: Normocephalic atraumatic  Mouth: no airway obstruction, posterior oropharynx clear without erythema or exudate  Neck: supple  Cardiac: regular rate and rhythm, no MRG  Resp: Lungs CTAB  GI: Abd s/nt/nd  Neuro: CN2-12 intact, strength 5/5x4, sensation grossly intact  Skin: No rashes

## 2024-04-06 NOTE — ED STATDOCS - OBJECTIVE STATEMENT
42 y/o female with PMHx of hypothyroid, s/p cholecystectomy; presents to ED c/o x4 days of sharp left eye pain and redness, denies discharge. Also notes she has been experiencing persistent URI symptoms including productive cough, chest pain, body aches, dyspnea x1 month. Patient states she also has been experiencing . Saw PMD Dr Santiago x1 month ago  Patient is [place]uran-speaking,  was used  #[id number] 44 y/o female with PMHx of hypothyroid, s/p cholecystectomy; presents to ED c/o x4 days of multiple complaints including congestion, dry cough, bodyaches, b/l eye irritation and redness, denies eye discharge.  No CP, SOB, fever, NVD, abdominal pain, urinary symptoms, black or bloody stools. Saw PMD Dr Santiago x1 month ago  Patient is Malay-speaking,  was used  #[id number]

## 2024-04-06 NOTE — ED STATDOCS - WR INTERPRETATION DATE TIME  1
24yo  POD#1 s/p rLTCS, in stable condition. 26yo  POD#1 s/p rLTCS w/ Mirena IUD placement, in stable condition. 06-Apr-2024 21:00

## 2024-05-08 NOTE — ED STATDOCS - NSDCPRINTRESULTS_ED_ALL_ED
Patient requests all Lab, Cardiology, and Radiology Results on their Discharge Instructions
Bed in lowest position, wheels locked, appropriate side rails in place/Call bell, personal items and telephone in reach/Instruct patient to call for assistance before getting out of bed or chair/Non-slip footwear when patient is out of bed/[place] to call system/Physically safe environment - no spills, clutter or unnecessary equipment/Purposeful Proactive Rounding/Room/bathroom lighting operational, light cord in reach

## 2024-06-20 ENCOUNTER — APPOINTMENT (OUTPATIENT)
Dept: RADIOLOGY | Facility: CLINIC | Age: 44
End: 2024-06-20
Payer: COMMERCIAL

## 2024-06-20 ENCOUNTER — RESULT REVIEW (OUTPATIENT)
Age: 44
End: 2024-06-20

## 2024-06-20 ENCOUNTER — OUTPATIENT (OUTPATIENT)
Dept: OUTPATIENT SERVICES | Facility: HOSPITAL | Age: 44
LOS: 1 days | End: 2024-06-20
Payer: COMMERCIAL

## 2024-06-20 DIAGNOSIS — Z90.49 ACQUIRED ABSENCE OF OTHER SPECIFIED PARTS OF DIGESTIVE TRACT: Chronic | ICD-10-CM

## 2024-06-20 PROCEDURE — 73030 X-RAY EXAM OF SHOULDER: CPT | Mod: 26,50

## 2024-06-20 PROCEDURE — 73030 X-RAY EXAM OF SHOULDER: CPT

## 2024-07-08 ENCOUNTER — OUTPATIENT (OUTPATIENT)
Dept: OUTPATIENT SERVICES | Facility: HOSPITAL | Age: 44
LOS: 1 days | End: 2024-07-08
Payer: COMMERCIAL

## 2024-07-08 ENCOUNTER — APPOINTMENT (OUTPATIENT)
Dept: MAMMOGRAPHY | Facility: CLINIC | Age: 44
End: 2024-07-08
Payer: COMMERCIAL

## 2024-07-08 ENCOUNTER — APPOINTMENT (OUTPATIENT)
Dept: ULTRASOUND IMAGING | Facility: CLINIC | Age: 44
End: 2024-07-08

## 2024-07-08 DIAGNOSIS — N63.20 UNSPECIFIED LUMP IN THE LEFT BREAST, UNSPECIFIED QUADRANT: ICD-10-CM

## 2024-07-08 DIAGNOSIS — Z00.8 ENCOUNTER FOR OTHER GENERAL EXAMINATION: ICD-10-CM

## 2024-07-08 DIAGNOSIS — Z90.49 ACQUIRED ABSENCE OF OTHER SPECIFIED PARTS OF DIGESTIVE TRACT: Chronic | ICD-10-CM

## 2024-07-08 PROCEDURE — 76642 ULTRASOUND BREAST LIMITED: CPT

## 2024-07-08 PROCEDURE — 76642 ULTRASOUND BREAST LIMITED: CPT | Mod: 26,LT

## 2024-09-03 ENCOUNTER — EMERGENCY (EMERGENCY)
Facility: HOSPITAL | Age: 44
LOS: 0 days | Discharge: HOME | End: 2024-09-04
Attending: STUDENT IN AN ORGANIZED HEALTH CARE EDUCATION/TRAINING PROGRAM
Payer: MEDICAID

## 2024-09-03 VITALS — WEIGHT: 163.14 LBS

## 2024-09-03 DIAGNOSIS — R51.9 HEADACHE, UNSPECIFIED: ICD-10-CM

## 2024-09-03 DIAGNOSIS — Z90.49 ACQUIRED ABSENCE OF OTHER SPECIFIED PARTS OF DIGESTIVE TRACT: Chronic | ICD-10-CM

## 2024-09-03 DIAGNOSIS — R10.84 GENERALIZED ABDOMINAL PAIN: ICD-10-CM

## 2024-09-03 DIAGNOSIS — D21.9 BENIGN NEOPLASM OF CONNECTIVE AND OTHER SOFT TISSUE, UNSPECIFIED: ICD-10-CM

## 2024-09-03 DIAGNOSIS — R11.0 NAUSEA: ICD-10-CM

## 2024-09-03 DIAGNOSIS — R10.12 LEFT UPPER QUADRANT PAIN: ICD-10-CM

## 2024-09-03 DIAGNOSIS — M54.9 DORSALGIA, UNSPECIFIED: ICD-10-CM

## 2024-09-03 DIAGNOSIS — E03.9 HYPOTHYROIDISM, UNSPECIFIED: ICD-10-CM

## 2024-09-03 DIAGNOSIS — Z90.49 ACQUIRED ABSENCE OF OTHER SPECIFIED PARTS OF DIGESTIVE TRACT: ICD-10-CM

## 2024-09-03 DIAGNOSIS — Z87.19 PERSONAL HISTORY OF OTHER DISEASES OF THE DIGESTIVE SYSTEM: ICD-10-CM

## 2024-09-03 DIAGNOSIS — R30.0 DYSURIA: ICD-10-CM

## 2024-09-03 LAB
ALBUMIN SERPL ELPH-MCNC: 4.3 G/DL — SIGNIFICANT CHANGE UP (ref 3.3–5)
ALP SERPL-CCNC: 74 U/L — SIGNIFICANT CHANGE UP (ref 40–120)
ALT FLD-CCNC: 31 U/L — SIGNIFICANT CHANGE UP (ref 12–78)
ANION GAP SERPL CALC-SCNC: 8 MMOL/L — SIGNIFICANT CHANGE UP (ref 5–17)
APPEARANCE UR: CLEAR — SIGNIFICANT CHANGE UP
AST SERPL-CCNC: 17 U/L — SIGNIFICANT CHANGE UP (ref 15–37)
BACTERIA # UR AUTO: NEGATIVE /HPF — SIGNIFICANT CHANGE UP
BASOPHILS # BLD AUTO: 0.05 K/UL — SIGNIFICANT CHANGE UP (ref 0–0.2)
BASOPHILS NFR BLD AUTO: 0.6 % — SIGNIFICANT CHANGE UP (ref 0–2)
BILIRUB SERPL-MCNC: 0.6 MG/DL — SIGNIFICANT CHANGE UP (ref 0.2–1.2)
BILIRUB UR-MCNC: NEGATIVE — SIGNIFICANT CHANGE UP
BUN SERPL-MCNC: 15 MG/DL — SIGNIFICANT CHANGE UP (ref 7–23)
CALCIUM SERPL-MCNC: 9.1 MG/DL — SIGNIFICANT CHANGE UP (ref 8.5–10.1)
CAST: 0 /LPF — SIGNIFICANT CHANGE UP (ref 0–4)
CHLORIDE SERPL-SCNC: 110 MMOL/L — HIGH (ref 96–108)
CO2 SERPL-SCNC: 23 MMOL/L — SIGNIFICANT CHANGE UP (ref 22–31)
COLOR SPEC: YELLOW — SIGNIFICANT CHANGE UP
CREAT SERPL-MCNC: 0.66 MG/DL — SIGNIFICANT CHANGE UP (ref 0.5–1.3)
DIFF PNL FLD: ABNORMAL
EGFR: 109 ML/MIN/1.73M2 — SIGNIFICANT CHANGE UP
EOSINOPHIL # BLD AUTO: 0.17 K/UL — SIGNIFICANT CHANGE UP (ref 0–0.5)
EOSINOPHIL NFR BLD AUTO: 2.2 % — SIGNIFICANT CHANGE UP (ref 0–6)
GLUCOSE SERPL-MCNC: 100 MG/DL — HIGH (ref 70–99)
GLUCOSE UR QL: NEGATIVE MG/DL — SIGNIFICANT CHANGE UP
HCG SERPL-ACNC: <1 MIU/ML — SIGNIFICANT CHANGE UP
HCT VFR BLD CALC: 40.6 % — SIGNIFICANT CHANGE UP (ref 34.5–45)
HGB BLD-MCNC: 13.8 G/DL — SIGNIFICANT CHANGE UP (ref 11.5–15.5)
IMM GRANULOCYTES NFR BLD AUTO: 0.4 % — SIGNIFICANT CHANGE UP (ref 0–0.9)
KETONES UR-MCNC: NEGATIVE MG/DL — SIGNIFICANT CHANGE UP
LEUKOCYTE ESTERASE UR-ACNC: NEGATIVE — SIGNIFICANT CHANGE UP
LIDOCAIN IGE QN: 25 U/L — SIGNIFICANT CHANGE UP (ref 13–75)
LYMPHOCYTES # BLD AUTO: 2.81 K/UL — SIGNIFICANT CHANGE UP (ref 1–3.3)
LYMPHOCYTES # BLD AUTO: 36.1 % — SIGNIFICANT CHANGE UP (ref 13–44)
MCHC RBC-ENTMCNC: 29.4 PG — SIGNIFICANT CHANGE UP (ref 27–34)
MCHC RBC-ENTMCNC: 34 GM/DL — SIGNIFICANT CHANGE UP (ref 32–36)
MCV RBC AUTO: 86.6 FL — SIGNIFICANT CHANGE UP (ref 80–100)
MONOCYTES # BLD AUTO: 0.51 K/UL — SIGNIFICANT CHANGE UP (ref 0–0.9)
MONOCYTES NFR BLD AUTO: 6.6 % — SIGNIFICANT CHANGE UP (ref 2–14)
NEUTROPHILS # BLD AUTO: 4.21 K/UL — SIGNIFICANT CHANGE UP (ref 1.8–7.4)
NEUTROPHILS NFR BLD AUTO: 54.1 % — SIGNIFICANT CHANGE UP (ref 43–77)
NITRITE UR-MCNC: NEGATIVE — SIGNIFICANT CHANGE UP
PH UR: 5 — SIGNIFICANT CHANGE UP (ref 5–8)
PLATELET # BLD AUTO: 240 K/UL — SIGNIFICANT CHANGE UP (ref 150–400)
POTASSIUM SERPL-MCNC: 4 MMOL/L — SIGNIFICANT CHANGE UP (ref 3.5–5.3)
POTASSIUM SERPL-SCNC: 4 MMOL/L — SIGNIFICANT CHANGE UP (ref 3.5–5.3)
PROT SERPL-MCNC: 7.8 GM/DL — SIGNIFICANT CHANGE UP (ref 6–8.3)
PROT UR-MCNC: NEGATIVE MG/DL — SIGNIFICANT CHANGE UP
RBC # BLD: 4.69 M/UL — SIGNIFICANT CHANGE UP (ref 3.8–5.2)
RBC # FLD: 12.8 % — SIGNIFICANT CHANGE UP (ref 10.3–14.5)
RBC CASTS # UR COMP ASSIST: 1 /HPF — SIGNIFICANT CHANGE UP (ref 0–4)
SODIUM SERPL-SCNC: 141 MMOL/L — SIGNIFICANT CHANGE UP (ref 135–145)
SP GR SPEC: 1.01 — SIGNIFICANT CHANGE UP (ref 1–1.03)
SQUAMOUS # UR AUTO: 1 /HPF — SIGNIFICANT CHANGE UP (ref 0–5)
UROBILINOGEN FLD QL: 0.2 MG/DL — SIGNIFICANT CHANGE UP (ref 0.2–1)
WBC # BLD: 7.78 K/UL — SIGNIFICANT CHANGE UP (ref 3.8–10.5)
WBC # FLD AUTO: 7.78 K/UL — SIGNIFICANT CHANGE UP (ref 3.8–10.5)
WBC UR QL: 1 /HPF — SIGNIFICANT CHANGE UP (ref 0–5)

## 2024-09-03 PROCEDURE — 96374 THER/PROPH/DIAG INJ IV PUSH: CPT | Mod: XU

## 2024-09-03 PROCEDURE — 99284 EMERGENCY DEPT VISIT MOD MDM: CPT

## 2024-09-03 PROCEDURE — 85025 COMPLETE CBC W/AUTO DIFF WBC: CPT

## 2024-09-03 PROCEDURE — 76830 TRANSVAGINAL US NON-OB: CPT

## 2024-09-03 PROCEDURE — 74177 CT ABD & PELVIS W/CONTRAST: CPT | Mod: MC

## 2024-09-03 PROCEDURE — 76830 TRANSVAGINAL US NON-OB: CPT | Mod: 26

## 2024-09-03 PROCEDURE — 84702 CHORIONIC GONADOTROPIN TEST: CPT

## 2024-09-03 PROCEDURE — 96375 TX/PRO/DX INJ NEW DRUG ADDON: CPT

## 2024-09-03 PROCEDURE — 36415 COLL VENOUS BLD VENIPUNCTURE: CPT

## 2024-09-03 PROCEDURE — 80053 COMPREHEN METABOLIC PANEL: CPT

## 2024-09-03 PROCEDURE — 93975 VASCULAR STUDY: CPT

## 2024-09-03 PROCEDURE — 81001 URINALYSIS AUTO W/SCOPE: CPT

## 2024-09-03 PROCEDURE — 99285 EMERGENCY DEPT VISIT HI MDM: CPT | Mod: 25

## 2024-09-03 PROCEDURE — 74177 CT ABD & PELVIS W/CONTRAST: CPT | Mod: 26,MC

## 2024-09-03 PROCEDURE — 93975 VASCULAR STUDY: CPT | Mod: 26,1L

## 2024-09-03 PROCEDURE — 87086 URINE CULTURE/COLONY COUNT: CPT

## 2024-09-03 PROCEDURE — 83690 ASSAY OF LIPASE: CPT

## 2024-09-03 RX ORDER — ONDANSETRON 2 MG/ML
4 INJECTION, SOLUTION INTRAMUSCULAR; INTRAVENOUS ONCE
Refills: 0 | Status: COMPLETED | OUTPATIENT
Start: 2024-09-03 | End: 2024-09-03

## 2024-09-03 RX ORDER — FAMOTIDINE 10 MG/ML
20 INJECTION INTRAVENOUS ONCE
Refills: 0 | Status: COMPLETED | OUTPATIENT
Start: 2024-09-03 | End: 2024-09-03

## 2024-09-03 RX ORDER — KETOROLAC TROMETHAMINE 30 MG/ML
15 INJECTION, SOLUTION INTRAMUSCULAR ONCE
Refills: 0 | Status: DISCONTINUED | OUTPATIENT
Start: 2024-09-03 | End: 2024-09-03

## 2024-09-03 RX ADMIN — KETOROLAC TROMETHAMINE 15 MILLIGRAM(S): 30 INJECTION, SOLUTION INTRAMUSCULAR at 19:43

## 2024-09-03 RX ADMIN — FAMOTIDINE 20 MILLIGRAM(S): 10 INJECTION INTRAVENOUS at 19:27

## 2024-09-03 NOTE — ED STATDOCS - PATIENT PORTAL LINK FT
You can access the FollowMyHealth Patient Portal offered by [facility] by registering at the following website: [email or web address]. By joining [employer]’s FollowMyHealth portal, you will also be able to view your health information using other applications (apps) compatible with our system.

## 2024-09-03 NOTE — ED STATDOCS - OBJECTIVE STATEMENT
45 y/o F with PMHx of hypothyroidism, cholecystectomy, gastritis, ovarian cysts presents to the ED c/o generalized abd pain x1 week that has since concentrated on the LUQ and is burning in nature. Endorses nausea, mild HA, dizziness, dysuria, and L sided back pain. Denies vomiting, chest pain, vaginal bleeding. No hx of kidney stones. Tylenol taken at home with no relief. No drug or EtOH use. Nonsmoker.  used, ID#[id number]. 45 y/o F with PMHx of hypothyroidism, cholecystectomy, gastritis, ovarian cysts presents to the ED c/o generalized abd pain x1 week that has since concentrated on the LUQ and is burning in nature. Endorses nausea, mild HA, dizziness, dysuria, and L sided back pain. Denies vomiting, chest pain, vaginal bleeding. No hx of kidney stones. Tylenol taken at home with no relief. No drug or EtOH use. Nonsmoker.

## 2024-09-03 NOTE — ED STATDOCS - PROGRESS NOTE DETAILS
Pt states that symptoms have improved. Pt requesting to go home.      Work-up negative for acute pathology in ED. US + fibroid.     Signs, symptoms, results were reviewed with patient.  Pt agreed to return if symptoms return and/or worsen.  Pt agrees to follow up with Gyn. Red Flags discussed with patient. Patient understands to follow-up at their scheduled appointment and take medications as prescribed. All this was discussed in layman's terms with the patient.

## 2024-09-03 NOTE — ED STATDOCS - PHYSICAL EXAMINATION
General: Patient in no acute distress, AAOX3.   HENMT: NC/AT, no nasal congestion, MMM  Neck: supple  CVS: regular rate and rhythm, no murmur  Resp: Good air entry bilaterally, No wheeze/rhonchi.  Abd: Soft, ttp LUQ, non distended, +bowel sounds, No guarding, rebound tenderness   Ext: FROM in all ext, 2+ pulses throughout, cap refill<2 sec.  BACK: no midline tenderness, no stepoffs, no CVA ttp  NEURO: no focal deficit, gross motor and sensory intact throughout, gait stable.

## 2024-09-03 NOTE — ED ADULT TRIAGE NOTE - CHIEF COMPLAINT QUOTE
pt presents to the ED for abdominal pain x 1 week. denies N/V/D. endorses mild headache. reports pain wraps around her L side. denies any other complaints or discomforts at this time. BELGICA

## 2024-09-03 NOTE — ED STATDOCS - NSFOLLOWUPINSTRUCTIONS_ED_ALL_ED_FT
Patient advised to take tylenol or motrin over the counter as prescribed and follow up with PMD/ GYNfor follow up in 3-4 days.  patient and her  understands and agrees with plan.  Return to ER if symptoms worsens or develop new symptoms.     Fibromas en el útero. Qué debe saber  Fibroids in the Uterus: What to Know  Outline of a female body, with a close-up of fibroids in the uterus.  Los fibromas son tumores benignos que crecen en el útero. [place] significa que no son cáncer. Puede tener colleen o más fibromas. Los fibromas tienen diferentes tamaños y pesos, y crecen en distintas partes del útero. Algunos pueden crecer hasta volverse bastante grandes. La mayoría de los fibromas no necesitan tratamiento por parte de un médico.    ¿Cuáles son las causas?  Se desconoce la causa de esta afección.    ¿Qué incrementa el riesgo?  Es más probable que sufra esta afección si:  Tiene entre 30 y 40 años de edad y no juárez atravesado la menopausia. La menopausia es un momento en el que janusz persona ya no tiene el período menstrual.  Tiene familiares que hull tenido fibromas.  Tiene ascendencia afroamericana.  Inició carrillo período menstrual a los 10 años de edad o menos.  No ha dado a zuleyma.  Tiene sobrepeso u obesidad.  Tiene janusz dieta baja en frutas, verduras y vitamina D.  ¿Cuáles son los signos o síntomas?  Muchas personas no tienen síntomas. Si tiene síntomas, estos pueden incluir los siguientes:  Hemorragias intensas suad la menstruación.  Hemorragias entre los períodos menstruales.  Dolor y presión en la vikki entre los huesos de la cadera (pelvis).  Dolor suad las relaciones sexuales.  Problemas en la vejiga, yara necesidad de orinar de inmediato o con más frecuencia que lo habitual.  Incapacidad para quedar embarazada.  Imposibilidad de llevar un embarazo a término (aborto espontáneo).  ¿Cómo se diagnostica?  Esta afección se puede diagnosticar en función de lo siguiente:  Los síntomas y los antecedentes médicos.  Un examen físico que incluye la palpación para detectar tumores.  Pruebas de diagnóstico por imágenes, yara janusz ecografía o janusz resonancia magnética (RM).  ¿Cómo se trata?  El tratamiento de esta afección puede incluir:  Medicamentos.  Pueden darle medicamentos para tratar el dolor.  Es posible que también le administren hormonas. Las hormonas se administran en forma de píldora o inyección. También pueden administrarse mediante un dispositivo intrauterino (DIU).  Cirugía. Se pueden realizar para:  Extraer los fibromas. Esta se realiza si los fibromas dificultan el embarazo.  Extraer el útero.  Bloquear el flujo sanguíneo a los fibromas. [place] puede hacer que los fibromas reduzcan carrillo tamaño y desaparezcan.  Otros procedimientos para reducir el tamaño de los fibromas. El médico podrá realizar:  Janusz ARF (ablación por radiofrecuencia). Irma tratamiento utiliza calor y energía de radio para reducir el tamaño de los fibromas.  Janusz ecografía guiada por resonancia magnética (RM). Irma tratamiento utiliza ondas de ultrasonido para reducir el tamaño de los fibromas. [place] se realiza a través de la piel. No se hacen dodd en la piel.  Siga estas instrucciones en carrillo casa:  Medicamentos    Use los medicamentos únicamente según las indicaciones.  Consulte a carrillo médico si debe guille comprimidos de ryan o comer más alimentos ricos en ryan, yara verduras de hojas de color katerina oscuro. El sangrado abundante suad carrillo periodo menstrual puede causar bajos niveles de ryan en el organismo.  Control del dolor    A heating pad being used on the affected area.  Póngase calor en la espalda o en el vientre, según se lo indiquen.  Use la alphonse de calor que el médico le recomiende, yara janusz compresa de calor húmedo o janusz almohadilla térmica. Hágalo con la frecuencia que le hayan indicado.  Coloque janusz toalla entre la piel y la alphonse de calor.  Aplique calor suad 20 a 30 minutos.  Si la piel se le pone de color solano, retire el calor de inmediato para evitar quemaduras. El riesgo de quemaduras es mayor si no puede sentir el dolor, el calor o el frío.  Instrucciones generales    Preste mucha atención a carrillo ciclo mensual. Informe al médico si nota algún cambio, yara por ejemplo:  Hemorragia más intensa que requiere que cambie las compresas o los tampones más de lo habitual.  Un cambio en el número de días que le dura la menstruación.  Un cambio en los síntomas que aparecen con la menstruación, yara dolor de espalda o cólicos en carrillo vientre.  Concurra a todas las visitas de seguimiento. Yara parte del tratamiento, el médico debe controlar atentamente los fibromas para detectar cualquier cambio.  Comuníquese con un médico si:  Siente dolor en la pelvis o en el abdomen. Tiene cólicos en el vientre que no se alivian con medicamentos o calor.  Tiene un nuevo sangrado entre los períodos.  Observa más sangrado suad carrillo periodo menstrual o entre períodos menstruales.  Se siente más débil o más cansada que de costumbre.  Siente que va a desvanecerse.  Solicite ayuda de inmediato si:  Se desmaya.  Tiene dolor en la pelvis que empeora repentinamente.  Tiene un sangrado vaginal muy abundante que empapa un tampón o janusz compresa en 30 minutos o menos.  Esta información no tiene yara fin reemplazar el consejo del médico. Asegúrese de hacerle al médico cualquier pregunta que tenga.    Abdominal Pain, Adult  A health care provider talking to a person during a medical exam.  Pain in the abdomen (abdominal pain) can be caused by many things. In most cases, it gets better with no treatment or by being treated at home. But in some cases, it can be serious.    Your health care provider will ask questions about your medical history and do a physical exam to try to figure out what is causing your pain.    Follow these instructions at home:  Medicines    Take over-the-counter and prescription medicines only as told by your provider.  Do not take medicines that help you poop (laxatives) unless told by your provider.  General instructions    Watch your condition for any changes.  Drink enough fluid to keep your pee (urine) pale yellow.  Contact a health care provider if:  Your pain changes, gets worse, or lasts longer than expected.  You have severe cramping or bloating in your abdomen, or you vomit.  Your pain gets worse with meals, after eating, or with certain foods.  You are constipated or have diarrhea for more than 2–3 days.  You are not hungry, or you lose weight without trying.  You have signs of dehydration. These may include:  Dark pee, very little pee, or no pee.  Cracked lips or dry mouth.  Sleepiness or weakness.  You have pain when you pee (urinate) or poop.  Your abdominal pain wakes you up at night.  You have blood in your pee.  You have a fever.  Get help right away if:  You cannot stop vomiting.  Your pain is only in one part of the abdomen. Pain on the right side could be caused by appendicitis.  You have bloody or black poop (stool), or poop that looks like tar.  You have trouble breathing.  You have chest pain.  These symptoms may be an emergency. Get help right away. Call 911.  Do not wait to see if the symptoms will go away.  Do not drive yourself to the hospital.  This information is not intended to replace advice given to you by your health care provider. Make sure you discuss any questions you have with your health care provider.

## 2024-09-03 NOTE — ED STATDOCS - CLINICAL SUMMARY MEDICAL DECISION MAKING FREE TEXT BOX
43 y/o F with PMHx of hypothyroidism, cholecystectomy, gastritis, ovarian cysts presents to the ED c/o generalized abd pain x1 week that has since concentrated on the LUQ and is burning in nature. Will r/o pancreatitis vs colitis vs gastritis. Plan on labs, imaging, pain control, reassess.

## 2024-09-04 VITALS
HEART RATE: 63 BPM | DIASTOLIC BLOOD PRESSURE: 80 MMHG | RESPIRATION RATE: 16 BRPM | OXYGEN SATURATION: 100 % | TEMPERATURE: 98 F | SYSTOLIC BLOOD PRESSURE: 118 MMHG

## 2024-09-04 NOTE — ED ADULT NURSE NOTE - NSFALLUNIVINTERV_ED_ALL_ED
Bed/Stretcher in lowest position, wheels locked, appropriate side rails in place/Call bell, personal items and telephone in reach/Instruct patient to call for assistance before getting out of bed/chair/stretcher/Non-slip footwear applied when patient is off stretcher/[place] to call system/Physically safe environment - no spills, clutter or unnecessary equipment/Purposeful proactive rounding/Room/bathroom lighting operational, light cord in reach

## 2024-09-04 NOTE — ED ADULT NURSE NOTE - OBJECTIVE STATEMENT
pt presents to the ED for abdominal pain x 1 week. denies N/V/D. endorses mild headache. reports pain wraps around her L side. denies any other complaints or discomforts at this time. Pt denies CP/SOB, N/V/D, fever/chills, dizziness.

## 2024-09-05 LAB
CULTURE RESULTS: SIGNIFICANT CHANGE UP
SPECIMEN SOURCE: SIGNIFICANT CHANGE UP

## 2024-11-20 ENCOUNTER — APPOINTMENT (OUTPATIENT)
Dept: PULMONOLOGY | Facility: CLINIC | Age: 44
End: 2024-11-20

## 2024-11-27 DIAGNOSIS — Z13.83 ENCOUNTER FOR SCREENING FOR RESPIRATORY DISORDER NEC: ICD-10-CM

## 2024-12-04 ENCOUNTER — APPOINTMENT (OUTPATIENT)
Dept: PULMONOLOGY | Facility: CLINIC | Age: 44
End: 2024-12-04

## 2024-12-23 ENCOUNTER — OUTPATIENT (OUTPATIENT)
Dept: OUTPATIENT SERVICES | Facility: HOSPITAL | Age: 44
LOS: 1 days | End: 2024-12-23
Payer: COMMERCIAL

## 2024-12-23 ENCOUNTER — APPOINTMENT (OUTPATIENT)
Dept: ULTRASOUND IMAGING | Facility: CLINIC | Age: 44
End: 2024-12-23
Payer: SELF-PAY

## 2024-12-23 DIAGNOSIS — Z90.49 ACQUIRED ABSENCE OF OTHER SPECIFIED PARTS OF DIGESTIVE TRACT: Chronic | ICD-10-CM

## 2024-12-23 DIAGNOSIS — Z00.8 ENCOUNTER FOR OTHER GENERAL EXAMINATION: ICD-10-CM

## 2024-12-23 PROCEDURE — 76830 TRANSVAGINAL US NON-OB: CPT

## 2024-12-23 PROCEDURE — 76830 TRANSVAGINAL US NON-OB: CPT | Mod: 26

## 2024-12-23 PROCEDURE — 76856 US EXAM PELVIC COMPLETE: CPT

## 2024-12-23 PROCEDURE — 76856 US EXAM PELVIC COMPLETE: CPT | Mod: 26

## 2025-01-29 ENCOUNTER — APPOINTMENT (OUTPATIENT)
Dept: PULMONOLOGY | Facility: CLINIC | Age: 45
End: 2025-01-29

## 2025-04-02 ENCOUNTER — APPOINTMENT (OUTPATIENT)
Dept: PULMONOLOGY | Facility: CLINIC | Age: 45
End: 2025-04-02
Payer: COMMERCIAL

## 2025-04-02 VITALS
HEIGHT: 59.65 IN | SYSTOLIC BLOOD PRESSURE: 114 MMHG | DIASTOLIC BLOOD PRESSURE: 72 MMHG | OXYGEN SATURATION: 99 % | HEART RATE: 69 BPM | WEIGHT: 158 LBS | BODY MASS INDEX: 31.02 KG/M2

## 2025-04-02 DIAGNOSIS — R07.89 OTHER CHEST PAIN: ICD-10-CM

## 2025-04-02 PROCEDURE — ZZZZZ: CPT

## 2025-04-02 PROCEDURE — 94726 PLETHYSMOGRAPHY LUNG VOLUMES: CPT

## 2025-04-02 PROCEDURE — 94729 DIFFUSING CAPACITY: CPT

## 2025-04-02 PROCEDURE — 94060 EVALUATION OF WHEEZING: CPT

## 2025-04-02 PROCEDURE — 99202 OFFICE O/P NEW SF 15 MIN: CPT | Mod: 25,GC

## 2025-04-07 NOTE — ED ADULT NURSE NOTE - RN DISCHARGE SIGNATURE
[FreeTextEntry1] : Feb 2025: urine checked due to Dyuria , no hemturia, no no fever or N/V had back pain 2m before and ER visit - neg rory [facility] did not treat UTI as cx not back but was told to f/u when cx back : KLEBSIELLA admits to increased water, sugar levels high PSA ( june 2024) : 0.81  Here for f/u aftre recent psa of 3.5 some malodorous urne wiht good flwo , and feels emptya after void, adnitts to increased water, no frequency or urgency if holds too long, nocturia 2-3 x due to fluids in eveni g bowelr habits good steady flow and clear urine wiht fluids ( water) , not cloudy or hematuria some lower back pain /hip but not over bladder or flanks bilat U cx: Kle and e coli - sens to keflex ( declined Cipro use) psa dec to 2.0 SHALA and bladder US showed 23 ml pvr and no hydro or stones  Here today for repeat urine and psa after treatment for uti hx of left flank paon - 80 % better , urine is clear , odor rel to vitamins, no urgency  , nocturia down finished abx last monday leaving for [place] 2-3 weeks  16-Sep-2018

## 2025-05-14 NOTE — ED ADULT NURSE NOTE - NS_SISCREENINGSR_GEN_ALL_ED
Department of Anesthesiology  Postprocedure Note    Patient: Ngozi Lainez  MRN: [id number]  YOB: 1961  Date of evaluation: 5/14/2025    Procedure Summary       Date: 05/13/25 Room / Location: [facility] 04 / [facility] ENDOSCOPY    Anesthesia Start: 1044 Anesthesia Stop: 1135    Procedures:       ESOPHAGOGASTRODUODENOSCOPY      COLONOSCOPY DIAGNOSTIC Diagnosis:       Epigastric pain      Personal history of colon polyps, unspecified      FH: colonic polyps      Family history of colon cancer      (Epigastric pain [R10.13])      (Personal history of colon polyps, unspecified [Z86.0100])      (FH: colonic polyps [Z83.719])      (Family history of colon cancer [Z80.0])    Surgeons: Truman Marquez MD Responsible Provider: Pio Franklin MD    Anesthesia Type: MAC ASA Status: 3            Anesthesia Type: MAC    Pravin Phase I: Pravin Score: 10    Pravin Phase II: Pravin Score: 10    Anesthesia Post Evaluation    Patient location during evaluation: bedside  Nausea & Vomiting: no nausea  Cardiovascular status: blood pressure returned to baseline  Respiratory status: acceptable  Hydration status: euvolemic    No notable events documented.   Negative

## 2025-06-11 NOTE — ED ADULT NURSE NOTE - NS ED PATIENT SAFETY CONCERN
Post op day 2: Phone Call     Discharge Instructions:    Ask patient about his or her discharge instructions:  Patient Confirmed Understanding    2.   What, if any, recommendations, teaching, or interventions did you provide?   Not Applicable    Health status:    3.   Pain controlled?    Yes    4.   Recommended interventions:    No   If YES, comment Not Applicable    5.   Incision/dressing status:   Ace wrap removed by HH, dsg intact    6.   ANA - Green light blinking?   Yes    7.   Difficulties urination?    No    8.   Last BM?  Date: 6/8/2025  If no BM by day 3-recommend OTC suppository or fleets enema: recommended laxative as needed    Medications:    9.   Reviewed Medications with Patient/Family/Caregiver?   Yes     10. Patient taking medications as prescribed?   Yes    11. If not taking medications as prescribed, note specific medicine(s) and reason for each:    Not Applicable    Hospital Follow Up Plan:    12. Follow up Appointment with Orthopedic surgeon:   Patient confirms follow up appointment    13. Home Care ordered at discharge?  Yes         14. Home Care started, or contact made?   Yes  If no, action taken: Not Applicable    15. DME obtained/used in home?    Yes    16. Using IS?   Not Applicable    17. Other information:    Not Applicable   
No

## 2025-06-17 ENCOUNTER — EMERGENCY (EMERGENCY)
Facility: HOSPITAL | Age: 45
LOS: 0 days | Discharge: HOME | End: 2025-06-17
Attending: STUDENT IN AN ORGANIZED HEALTH CARE EDUCATION/TRAINING PROGRAM
Payer: MEDICAID

## 2025-06-17 VITALS — WEIGHT: 175.05 LBS | HEIGHT: 66 IN

## 2025-06-17 VITALS
RESPIRATION RATE: 18 BRPM | HEART RATE: 64 BPM | DIASTOLIC BLOOD PRESSURE: 76 MMHG | OXYGEN SATURATION: 100 % | TEMPERATURE: 98 F | SYSTOLIC BLOOD PRESSURE: 126 MMHG

## 2025-06-17 DIAGNOSIS — Z90.49 ACQUIRED ABSENCE OF OTHER SPECIFIED PARTS OF DIGESTIVE TRACT: Chronic | ICD-10-CM

## 2025-06-17 DIAGNOSIS — L29.9 PRURITUS, UNSPECIFIED: ICD-10-CM

## 2025-06-17 DIAGNOSIS — R51.9 HEADACHE, UNSPECIFIED: ICD-10-CM

## 2025-06-17 DIAGNOSIS — E03.9 HYPOTHYROIDISM, UNSPECIFIED: ICD-10-CM

## 2025-06-17 DIAGNOSIS — R68.83 CHILLS (WITHOUT FEVER): ICD-10-CM

## 2025-06-17 PROCEDURE — 99284 EMERGENCY DEPT VISIT MOD MDM: CPT

## 2025-06-17 PROCEDURE — 99283 EMERGENCY DEPT VISIT LOW MDM: CPT

## 2025-06-17 RX ORDER — PREDNISONE 20 MG/1
2 TABLET ORAL
Qty: 10 | Refills: 0
Start: 2025-06-17 | End: 2025-06-21

## 2025-06-17 RX ORDER — ACETAMINOPHEN 500 MG/5ML
650 LIQUID (ML) ORAL ONCE
Refills: 0 | Status: COMPLETED | OUTPATIENT
Start: 2025-06-17 | End: 2025-06-17

## 2025-06-17 RX ORDER — PREDNISONE 20 MG/1
50 TABLET ORAL ONCE
Refills: 0 | Status: COMPLETED | OUTPATIENT
Start: 2025-06-17 | End: 2025-06-17

## 2025-06-17 RX ORDER — DIPHENHYDRAMINE HCL 12.5MG/5ML
2 ELIXIR ORAL
Qty: 40 | Refills: 0
Start: 2025-06-17 | End: 2025-06-21

## 2025-06-17 RX ORDER — DIPHENHYDRAMINE HCL 12.5MG/5ML
50 ELIXIR ORAL ONCE
Refills: 0 | Status: COMPLETED | OUTPATIENT
Start: 2025-06-17 | End: 2025-06-17

## 2025-06-17 RX ADMIN — PREDNISONE 50 MILLIGRAM(S): 20 TABLET ORAL at 20:14

## 2025-06-17 RX ADMIN — Medication 650 MILLIGRAM(S): at 20:12

## 2025-06-17 RX ADMIN — Medication 50 MILLIGRAM(S): at 20:14

## 2025-06-17 RX ADMIN — Medication 20 MILLIGRAM(S): at 20:14

## 2025-06-17 NOTE — ED STATDOCS - NSFOLLOWUPINSTRUCTIONS_ED_ALL_ED_FT
SIGUE A TU MÉDICO EN 1-2 DÍAS. REGRESE A LA ER PARA CUALQUIER SÍNTOMA PENDIENTE O NUEVAS PREOCUPACIONES.     Erupción cutánea en los adultos  Rash, Adult  Heat rash on a person's hand.  Janusz erupción cutánea es janusz erupción de manchas o parches en la piel. Es un cambio en el aspecto y la sensibilidad de la piel. Muchas cosas pueden ocasionar janusz erupción cutánea.    El objetivo del tratamiento es calmar la picazón y evitar que la erupción se propague.    Siga estas instrucciones en carrillo casa:  Medicamentos    A tube of ointment.  Use o aplique los medicamentos de venta odalys y los recetados solamente seth se lo haya indicado el médico. Estos podrían incluir medicamentos para tratar lo siguiente:  Piel enrojecida e hinchada.  Picazón.  Janusz alergia.  Dolor.  Janusz infección.  Cuidado de la piel    Aplique paños fríos y húmedos en la vikki de la erupción.  No se rasque ni se frote la piel.  Trate de no cubrir la vikki de la erupción. Manténgala expuesta al aire tanto seth sea posible.  Control de la picazón y las molestias    Evite los liz o las duchas calientes. Estos pueden empeorar la picazón. Janusz ducha fría puede aliviar el malestar.  Trate de guille un baño con lo siguiente:  Sales de [employer]. Puede conseguirlas en la ignacio de comestibles o en la farmacia. Siga las instrucciones del envase.  Bicarbonato de sodio. Vierta un poco en la bañera seth se lo haya indicado el médico.  Samia coloidal. Puede conseguirla en la ignacio de comestibles o en la farmacia. Siga las instrucciones del envase.  Intente colocarse janusz pasta de bicarbonato de sodio sobre la piel. Agregue agua al bicarbonato de sodio hasta que se forme janusz pasta.  Intente aplicarse janusz loción para aliviar la picazón (loción de calamina).  Manténgase fresco. Evite exponerse al sol. La transpiración y el calor pueden empeorar la picazón.  Instrucciones generales    A person writing in a journal.   Descanse todo lo que sea necesario.  Selam suficiente líquido para mantener el pis (orina) de color amarillo pálido.  Use ropa suelta.  Evite los detergentes y los jabones perfumados, y los perfumes. Utilice jabones, detergentes, perfumes y cosméticos suaves.  Evite usar lo que juárez causado la erupción. Lleve un diario seth ayuda para registrar lo que le causa erupción. Escriba los siguientes datos:  Lo que come.  Qué productos cosméticos usa.  Lo que viridiana.  La ropa que usa. [place] incluye las alhajas.  Comuníquese con un médico si:  Transpira mucho de noche.  Hace pis (orina) más o menos de lo normal.  La orina es de color más oscuro que lo normal.  Los ojos tienen sensibilidad a la zuleyma.  La piel y la parte lana de los ojos se ponen amarillos.  Tiene adormecimiento o siente hormigueo en la piel.  Tiene ampollas dolorosas en la nariz o la boca.  La erupción cutánea no desaparece después de algunos días o empeora.  Está más cansado que lo habitual.  Está más sediento que lo habitual.  Tiene síntomas nuevos o estos empeoran. Pueden incluir:  Dolor en el vientre.  Fiebre.  Materia fecal líquida (diarrea).  Vómitos.  Debilidad.  Pérdida de peso.  Solicite ayuda de inmediato si:  Empieza a sentirse desorientado (confundido).  Siente un dolor de damien intenso o tiene rigidez en el nish.  Siente mucho dolor o rigidez en las articulaciones.  Se siente muy somnoliento o no reacciona.  Tiene janusz convulsión.  Esta información no tiene seth fin reemplazar el consejo del médico. Asegúrese de hacerle al médico cualquier pregunta que tenga.

## 2025-06-17 NOTE — ED STATDOCS - CLINICAL SUMMARY MEDICAL DECISION MAKING FREE TEXT BOX
Presentation concerning for intermittent allergic rash. Patient is afebrile and currently has no rash but she is complaining of itching. Plan for Benadryl, Prednisone, and Famotidine. Monitor and reassess. Presentation concerning for intermittent allergic rash. Patient is afebrile and currently has no rash but she is complaining of itching. Plan for Benadryl, Prednisone, and Famotidine. Monitor and reassess. Pt feels better after medications, dc in stable condition with follow up instructions and strict return precautions

## 2025-06-17 NOTE — ED STATDOCS - PROGRESS NOTE DETAILS
signed Regina Pizarro PA-C Pt seen and examined initially in intake by Dr. Ty.  ID [id number]  45F c/o intermittent itchy rash on body for past week or so. Unclear cause of symptoms. Will DC on rx prednisone, pepcid, benadryl PRN. return precautions given. f/u PMD [facility]. return precautions given. Pt feeling well at [facility], agrees with DC and plan of care.

## 2025-06-17 NOTE — ED STATDOCS - OBJECTIVE STATEMENT
45 year old female with past medical history of hypothyroidism, presents to ED c/o feeling itchy for the past five days, intermittently. She states her entire body feels itchy including her eyes and scalp. Patient denies changing soap or lotion. 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 Patient states she has chills and also feels a headache coming. She denies fevers or vomiting.  : [id number]

## 2025-06-17 NOTE — ED STATDOCS - PATIENT PORTAL LINK FT
You can access the FollowMyHealth Patient Portal offered by [facility] by registering at the following website: [email or web address]. By joining [email or web address]’s FollowMyHealth portal, you will also be able to view your health information using other applications (apps) compatible with our system.

## 2025-06-17 NOTE — ED STATDOCS - PHYSICAL EXAMINATION
Constitutional: well appearing, NAD AAOx3  Eyes: EOMI, PERRL  Head: Normocephalic atraumatic  Mouth: no airway obstruction, posterior oropharynx clear without erythema or exudate  Neck: supple  Cardiac: regular rate and rhythm, no MRG  Resp: Lungs CTAB  GI: Abd s/nt/nd  Neuro: CN2-12 intact, strength 5/5x4, sensation grossly intact  Skin: No rashes Constitutional: well appearing, NAD AAOx3  Eyes: EOMI, PERRL  Head: Normocephalic atraumatic  Mouth: no airway obstruction, posterior oropharynx clear without erythema or exudate  Neck: supple  Cardiac: regular rate and rhythm, no MRG  Resp: Lungs CTAB  GI: Abd s/nt/nd  Neuro: CN2-12 intact, strength 5/5x4, sensation grossly intact  Skin: No rashes or jaundice

## 2025-07-07 ENCOUNTER — EMERGENCY (EMERGENCY)
Facility: HOSPITAL | Age: 45
LOS: 0 days | Discharge: HOME | End: 2025-07-07
Attending: HOSPITALIST
Payer: MEDICAID

## 2025-07-07 VITALS
TEMPERATURE: 98 F | HEART RATE: 70 BPM | SYSTOLIC BLOOD PRESSURE: 114 MMHG | WEIGHT: 162.92 LBS | DIASTOLIC BLOOD PRESSURE: 69 MMHG | RESPIRATION RATE: 20 BRPM | OXYGEN SATURATION: 100 %

## 2025-07-07 VITALS — HEIGHT: 66 IN

## 2025-07-07 DIAGNOSIS — R07.89 OTHER CHEST PAIN: ICD-10-CM

## 2025-07-07 DIAGNOSIS — Z90.49 ACQUIRED ABSENCE OF OTHER SPECIFIED PARTS OF DIGESTIVE TRACT: Chronic | ICD-10-CM

## 2025-07-07 DIAGNOSIS — E03.9 HYPOTHYROIDISM, UNSPECIFIED: ICD-10-CM

## 2025-07-07 DIAGNOSIS — L29.9 PRURITUS, UNSPECIFIED: ICD-10-CM

## 2025-07-07 DIAGNOSIS — B85.0 PEDICULOSIS DUE TO PEDICULUS HUMANUS CAPITIS: ICD-10-CM

## 2025-07-07 LAB
ALBUMIN SERPL ELPH-MCNC: 3.6 G/DL — SIGNIFICANT CHANGE UP (ref 3.3–5)
ALP SERPL-CCNC: 77 U/L — SIGNIFICANT CHANGE UP (ref 40–120)
ALT FLD-CCNC: 18 U/L — SIGNIFICANT CHANGE UP (ref 12–78)
ANION GAP SERPL CALC-SCNC: 5 MMOL/L — SIGNIFICANT CHANGE UP (ref 5–17)
APTT BLD: 30.9 SEC — SIGNIFICANT CHANGE UP (ref 26.1–36.8)
AST SERPL-CCNC: 13 U/L — LOW (ref 15–37)
BASOPHILS # BLD AUTO: 0.06 K/UL — SIGNIFICANT CHANGE UP (ref 0–0.2)
BASOPHILS NFR BLD AUTO: 0.7 % — SIGNIFICANT CHANGE UP (ref 0–2)
BILIRUB SERPL-MCNC: 0.3 MG/DL — SIGNIFICANT CHANGE UP (ref 0.2–1.2)
BUN SERPL-MCNC: 17 MG/DL — SIGNIFICANT CHANGE UP (ref 7–23)
CALCIUM SERPL-MCNC: 8.5 MG/DL — SIGNIFICANT CHANGE UP (ref 8.5–10.1)
CHLORIDE SERPL-SCNC: 110 MMOL/L — HIGH (ref 96–108)
CO2 SERPL-SCNC: 23 MMOL/L — SIGNIFICANT CHANGE UP (ref 22–31)
CREAT SERPL-MCNC: 0.77 MG/DL — SIGNIFICANT CHANGE UP (ref 0.5–1.3)
EGFR: 96 ML/MIN/1.73M2 — SIGNIFICANT CHANGE UP
EGFR: 96 ML/MIN/1.73M2 — SIGNIFICANT CHANGE UP
EOSINOPHIL # BLD AUTO: 0.25 K/UL — SIGNIFICANT CHANGE UP (ref 0–0.5)
EOSINOPHIL NFR BLD AUTO: 3 % — SIGNIFICANT CHANGE UP (ref 0–6)
GLUCOSE SERPL-MCNC: 109 MG/DL — HIGH (ref 70–99)
HCG SERPL-ACNC: <1 MIU/ML — SIGNIFICANT CHANGE UP
HCT VFR BLD CALC: 38.5 % — SIGNIFICANT CHANGE UP (ref 34.5–45)
HGB BLD-MCNC: 12.3 G/DL — SIGNIFICANT CHANGE UP (ref 11.5–15.5)
IMM GRANULOCYTES # BLD AUTO: 0.02 K/UL — SIGNIFICANT CHANGE UP (ref 0–0.07)
IMM GRANULOCYTES NFR BLD AUTO: 0.2 % — SIGNIFICANT CHANGE UP (ref 0–0.9)
INR BLD: 0.89 RATIO — SIGNIFICANT CHANGE UP (ref 0.85–1.16)
LYMPHOCYTES # BLD AUTO: 3.38 K/UL — HIGH (ref 1–3.3)
LYMPHOCYTES NFR BLD AUTO: 40 % — SIGNIFICANT CHANGE UP (ref 13–44)
MCHC RBC-ENTMCNC: 28.2 PG — SIGNIFICANT CHANGE UP (ref 27–34)
MCHC RBC-ENTMCNC: 31.9 G/DL — LOW (ref 32–36)
MCV RBC AUTO: 88.3 FL — SIGNIFICANT CHANGE UP (ref 80–100)
MONOCYTES # BLD AUTO: 0.59 K/UL — SIGNIFICANT CHANGE UP (ref 0–0.9)
MONOCYTES NFR BLD AUTO: 7 % — SIGNIFICANT CHANGE UP (ref 2–14)
NEUTROPHILS # BLD AUTO: 4.16 K/UL — SIGNIFICANT CHANGE UP (ref 1.8–7.4)
NEUTROPHILS NFR BLD AUTO: 49.1 % — SIGNIFICANT CHANGE UP (ref 43–77)
NRBC # BLD AUTO: 0 K/UL — SIGNIFICANT CHANGE UP (ref 0–0)
NRBC # FLD: 0 K/UL — SIGNIFICANT CHANGE UP (ref 0–0)
NRBC BLD AUTO-RTO: 0 /100 WBCS — SIGNIFICANT CHANGE UP (ref 0–0)
PLATELET # BLD AUTO: 246 K/UL — SIGNIFICANT CHANGE UP (ref 150–400)
PMV BLD: 10.2 FL — SIGNIFICANT CHANGE UP (ref 7–13)
POTASSIUM SERPL-MCNC: 3.6 MMOL/L — SIGNIFICANT CHANGE UP (ref 3.5–5.3)
POTASSIUM SERPL-SCNC: 3.6 MMOL/L — SIGNIFICANT CHANGE UP (ref 3.5–5.3)
PROT SERPL-MCNC: 6.7 GM/DL — SIGNIFICANT CHANGE UP (ref 6–8.3)
PROTHROM AB SERPL-ACNC: 10.3 SEC — SIGNIFICANT CHANGE UP (ref 9.9–13.4)
RBC # BLD: 4.36 M/UL — SIGNIFICANT CHANGE UP (ref 3.8–5.2)
RBC # FLD: 12.9 % — SIGNIFICANT CHANGE UP (ref 10.3–14.5)
SODIUM SERPL-SCNC: 138 MMOL/L — SIGNIFICANT CHANGE UP (ref 135–145)
TROPONIN I, HIGH SENSITIVITY RESULT: <3 NG/L — SIGNIFICANT CHANGE UP
WBC # BLD: 8.46 K/UL — SIGNIFICANT CHANGE UP (ref 3.8–10.5)
WBC # FLD AUTO: 8.46 K/UL — SIGNIFICANT CHANGE UP (ref 3.8–10.5)

## 2025-07-07 PROCEDURE — 85730 THROMBOPLASTIN TIME PARTIAL: CPT

## 2025-07-07 PROCEDURE — 84702 CHORIONIC GONADOTROPIN TEST: CPT

## 2025-07-07 PROCEDURE — 93005 ELECTROCARDIOGRAM TRACING: CPT

## 2025-07-07 PROCEDURE — 80053 COMPREHEN METABOLIC PANEL: CPT

## 2025-07-07 PROCEDURE — 99284 EMERGENCY DEPT VISIT MOD MDM: CPT

## 2025-07-07 PROCEDURE — 84484 ASSAY OF TROPONIN QUANT: CPT

## 2025-07-07 PROCEDURE — 36415 COLL VENOUS BLD VENIPUNCTURE: CPT

## 2025-07-07 PROCEDURE — 99283 EMERGENCY DEPT VISIT LOW MDM: CPT | Mod: 25

## 2025-07-07 PROCEDURE — 93010 ELECTROCARDIOGRAM REPORT: CPT

## 2025-07-07 PROCEDURE — 85025 COMPLETE CBC W/AUTO DIFF WBC: CPT

## 2025-07-07 PROCEDURE — 85610 PROTHROMBIN TIME: CPT

## 2025-07-07 NOTE — ED STATDOCS - PHYSICAL EXAMINATION
Constitutional: NAD AAOx3  Eyes: PERRLA EOMI  Head: Normocephalic atraumatic. Hair with tiny white spots on hair shaft.  Mouth: MMM  Cardiac: regular rate   Resp: Lungs CTAB  GI: Abd s/nt/nd  Neuro: CN2-12 intact  Skin: No visible rashes

## 2025-07-07 NOTE — ED STATDOCS - PATIENT PORTAL LINK FT
You can access the FollowMyHealth Patient Portal offered by [facility] by registering at the following website: [email or web address]. By joining [employer]’s FollowMyHealth portal, you will also be able to view your health information using other applications (apps) compatible with our system.

## 2025-07-07 NOTE — ED STATDOCS - NSFOLLOWUPCLINICS_GEN_ALL_ED_FT
[facility] [place] [id number] [place] [address]  Phone: [phone]  Fax:   Follow Up Time: 4-6 Days

## 2025-07-07 NOTE — ED STATDOCS - NS_ ATTENDINGSCRIBEDETAILS _ED_A_ED_FT
Debbie Hernandez MD: The history, relevant review of systems, past medical and surgical history, medical decision making, and physical examination was documented by the scribe in my presence and I attest to the accuracy of the documentation.

## 2025-07-07 NOTE — ED ADULT TRIAGE NOTE - CHIEF COMPLAINT QUOTE
Pt ambulatory to ED c/o itchiness of neck and head and indigestion x 15 days. Denies fevers, chills, rash, nausea, vomiting, diarrhea . Was recently DC from  for same complaint. Unknown allergies.

## 2025-07-07 NOTE — ED STATDOCS - NSFOLLOWUPCLINICSTOKEN_GEN_ALL_ED_FT
Age 65 Or Greater ([facility]-10): Yes
Prior History Of Falls Within 3 Months - An Unintentional Change In Position Resulting In Coming To Rest On The Ground Or At A Lower Level ([facility]-10): No
[id number]:4-6 Days|| ||00\01||False;

## 2025-07-07 NOTE — ED STATDOCS - OBJECTIVE STATEMENT
46 y/o female with PMHx of hypothyroidism presents to the ED c/o itchy scalp x2 months, was seen in ED, states she was given a pill which did not improve symptoms. Pt tried to f/u at [facility] however no appointment available as per pt. Also reports left sided chest pain x3days and feeling of pain radiating down left arm, pain has been intermittent. Also notes bruising spots on arms at varying stages. Denies weakness, fever.

## 2025-07-07 NOTE — ED STATDOCS - NSFOLLOWUPINSTRUCTIONS_ED_ALL_ED_FT
Please use NIX or RID as per package instructions. you may need a second dose to clear the infection.    Please also follow up at the [facility] for further evaluation of chest pain.    Piojos en los adultos  Lice, Adult  Lice nits in a person's hair, with a close-up view of a louse.  A pubic louse.  Los piojos son pequeños insectos con garras en los extremos de las patas. Son parásitos, lo que significa que tienen que vivir de otro animal para sobrevivir. Los piojos nacen de pequeños huevos redondos (liendres), que se pegan a la base del jolene.    Hay maria isabel tipos diferentes de piojos:  Piojos de la damien. Estos piojos viven en el jolene de la damien de la persona.  Piojos del cuerpo. Estos piojos viven en el vello corporal.  [place]. Estos piojos viven en el vello púbico.  Los piojos se pueden contagiar de janusz persona a otra. Los piojos no se contagian de las mascotas. Los piojos se arrastran, no vuelan ni saltan. Los piojos causan irritación de la piel y picazón intensa en la vikki del jolene afectada. Si pete tener piojos puede ser frustrante, no es peligroso. Los piojos no propagan enfermedades. En general, el tratamiento elimina los síntomas en unos pocos días.    ¿Cuáles son las causas?  Las causas de los piojos en la damien y en el cuerpo pueden ser las siguientes:  El contacto muy cercano con janusz persona que tiene piojos.  Compartir elementos que tocan la piel y el jolene con janusz persona que tiene piojos. Estos incluyen objetos personales, seth gorros, peines, cepillos, toallas, ropa, almohadas o sábanas.  Acostarse en janusz cama, un sofá o alfombra que haya sido utilizada recientemente por alguien con piojos.  La ladilla se contagia por contacto sexual.    ¿Qué incrementa el riesgo?  Si pete tener piojos es más común entre niños pequeños, cualquiera puede contraer piojos. El clima cálido aumenta el riesgo de contraer piojos.    ¿Cuáles son los signos o síntomas?  Los síntomas de esta afección incluyen:  Picazón en la vikki afectada.  Irritación de la piel.  Erupción o llagas en la piel.  Sensación de que algo se mueve en el jolene.  Pequeñas escamas o liendres cerca del cuero cabelludo. Estos pueden ser de color hernandez, amarillo o [place].  Pequeños bichos que trepan por el jolene, el cuero cabelludo o la vikki genital.  Problemas para dormir, ya que los piojos son más activos en la oscuridad.  ¿Cómo se diagnostica?  Esta afección se diagnostica en función de lo siguiente:  Signos y síntomas.  Un examen físico.  El médico examinará de cerca la vikki afectada para determinar si hay piojos vivos, liendres y cáscaras de huevos vacías.  Por lo general, las liendres son de color hernandez, amarillo o [place]. Las cáscaras de huevo vacías son delilah. Los piojos son de color mary jane o marrón y tienen el tamaño de janusz semilla de sésamo.  ¿Cómo se trata?  El tratamiento de esta afección incluye lo siguiente:  Usar un producto para el jolene que contenga un insecticida suave para matar piojos. El médico recomendará un enjuague recetado o de venta odalys para el jolene.  Retirar los piojos, las liendres y las cáscaras de liendres vacías con un peine o con pincitas.  Peter la ropa, las toallas y las sábanas con [place]. Séquelos después.  Colocar en bolsas los elementos que no puedan lavarse ni aspirarse.  Las mujeres embarazadas no deben usar champú o crema con medicamento sin consultar antes al médico.    Siga estas instrucciones en carrillo casa:  Use un producto con medicamento    A person using a comb to get rid of lice in another person's hair.  Aplique el enjuague para el jolene con medicamento seth se lo haya indicado el médico o según las instrucciones del envase. Siga cuidadosamente las instrucciones de la etiqueta. Las instrucciones generales para la aplicación de estos productos pueden incluir estos pasos:  Colóquese ropa interior o janusz camisa vieja, o utilice janusz toalla vieja en ladan de que el producto manche.  Lávese la damien o el pubis y seque con janusz toalla antes de aplicar el producto si se le indicó hacerlo.  Cuando el jolene esté seco, aplique el producto. Déjese el producto en el jolene suad el tiempo especificado en las instrucciones.  Enjuague la vikki con agua.  Peine el jolene húmedo con un peine de dientes finos. Peine cerca de la piel. Peine desde la raíz hasta los extremos para quitar piojos, liendres o cáscaras de liendres. El producto con medicamento puede incluir un peine para piojos.  No se lave el jolene por 2 días mientras el medicamento arreguin los piojos.  Después del tratamiento, repita el procedimiento de peinar el jolene y quitar los piojos, las liendres y las cáscaras de liendres cada 2 o 3 días. Raquel esto suad 2 a 3 semanas. Después del tratamiento, los piojos restantes deberían moverse más lento.  Repita el tratamiento después de 7 a 10 días si es necesario.  Eliminar piojos de otros elementos    Lave con [place] gorros, toallas, bufandas, chaquetas, ropa de cama y ropa que haya usado recientemente. Seque los elementos con el ciclo de aire caliente.  Coloque en bolsas plásticas los elementos que no se pueden peter y que puedan azael estado expuestos. Mantenga las bolsas pete cerradas suad 2 semanas para matar los piojos que pudieran azael quedado. Asegúrese de que no haya agujeros en las bolsas.  Sumerja en [place] suad 5 a 10 minutos todos los peines y cepillos.  Pase la aspiradora por las alfombras, los colchones y los muebles tapizados para quitar cualquier jolene suelto. No use sustancias químicas, que pueden ser venenosas (tóxicas). Los piojos sobreviven solo 1 o 2 días fuera de la piel humana. Las liendres pueden sobrevivir hasta 1 semana.  Instrucciones generales    Elimine del jolene los piojos, las liendres o las cáscaras de liendres restantes con un peine de dientes finos.  Si tiene piojos en la damien, pregunte al médico si otros familiares o personas que tengan un contacto cercano también deben examinarse o tratarse.  Si tiene ladillas, dígales a aiyana parejas sexuales que busquen tratamiento.  Concurra a todas las visitas de seguimiento. [place] es importante.  Comuníquese con un médico si:  Tiene llagas que parecen infectadas.  La erupción cutánea o las llagas no desaparecen en 1 semana.  Los piojos o las liendres regresan o no desaparecen a pesar del tratamiento.  Resumen  Los piojos son pequeños parásitos que viven en el cuerpo humano. Un parásito necesita vivir de otro animal para sobrevivir.  Los piojos se pueden contagiar de janusz persona a otra al estar cerca de janusz persona que tiene piojos o al compartir objetos personales, seth peines, cepillos, toallas, ropa o gorros. La ladilla se contagia por contacto sexual.  Los piojos se pueden tratar con un enjuague con medicamento para el jolene. Siga las instrucciones del médico o de la etiqueta del envase si realiza un tratamiento con aubrie producto.  Si tiene piojos en la damien, pregunte al médico si otros familiares o personas que tengan un contacto cercano también deben examinarse o tratarse. Si tiene ladillas, dígales a aiyana parejas sexuales que busquen tratamiento.  Esta información no tiene seth fin reemplazar el consejo del médico. Asegúrese de hacerle al médico cualquier pregunta que tenga.    Chest Pain    WHAT YOU NEED TO KNOW:    Chest pain can be caused by a range of conditions, from not serious to life-threatening. Chest pain can be a symptom of a digestive problem, such as acid reflux or a stomach ulcer. An anxiety attack or a strong emotion, such as anger, can also cause chest pain. Infection, inflammation, or a fracture in the bones or cartilage in your chest can cause pain or discomfort. Sometimes chest pain or pressure is caused by poor blood flow to your heart (angina). Chest pain may also be caused by life-threatening conditions such as a heart attack or blood clot in your lungs.     DISCHARGE INSTRUCTIONS:    Call 911 if:     You have any of the following signs of a heart attack:   Squeezing, pressure, or pain in your chest       and any of the following:   Discomfort or pain in your back, neck, jaw, stomach, or arm       Shortness of breath      Nausea or vomiting      Lightheadedness or a sudden cold sweat        Return to the emergency department if:     You have chest discomfort that gets worse, even with medicine.      You cough or vomit blood.       Your bowel movements are black or bloody.       You cannot stop vomiting, or it hurts to swallow.     Contact your healthcare provider if:     You have questions or concerns about your condition or care.        Medicines:     Medicines may be given to treat the cause of your chest pain. Examples include pain medicine, anxiety medicine, or medicines to increase blood flow to your heart.       Do not take certain medicines without asking your healthcare provider first. These include NSAIDs, herbal or vitamin supplements, or hormones (estrogen or progestin).       Take your medicine as directed. Contact your healthcare provider if you think your medicine is not helping or if you have side effects. Tell him or her if you are allergic to any medicine. Keep a list of the medicines, vitamins, and herbs you take. Include the amounts, and when and why you take them. Bring the list or the pill bottles to follow-up visits. Carry your medicine list with you in case of an emergency.    Follow up with your healthcare provider within 72 hours, or as directed: You may need to return for more tests to find the cause of your chest pain. You may be referred to a specialist, such as a cardiologist or gastroenterologist. Write down your questions so you remember to ask them during your visits.     Healthy living tips: The following are general healthy guidelines. If your chest pain is caused by a heart problem, your healthcare provider will give you specific guidelines to follow.    Do not smoke. Nicotine and other chemicals in cigarettes and cigars can cause lung and heart damage. Ask your healthcare provider for information if you currently smoke and need help to quit. E-cigarettes or smokeless tobacco still contain nicotine. Talk to your healthcare provider before you use these products.       Eat a variety of healthy, low-fat, low-salt foods. Healthy foods include fruits, vegetables, whole-grain breads, low-fat dairy products, beans, lean meats, and fish. Ask for more information about a heart healthy diet.      Drink plenty of water every day. Your body is made of mostly water. Water helps your body to control your temperature and blood pressure. Ask your healthcare provider how much water you should drink every day.      Ask about activity. Your healthcare provider will tell you which activities to limit or avoid. Ask when you can drive, return to work, and have sex. Ask about the best exercise plan for you.      Maintain a healthy weight. Ask your healthcare provider how much you should weigh. Ask him or her to help you create a weight loss plan if you are overweight.       Get the flu and pneumonia vaccines. All adults should get the influenza (flu) vaccine. Get it every year as soon as it becomes available. The pneumococcal vaccine is given to adults aged 65 years or older. The vaccine is given every 5 years to prevent pneumococcal disease, such as pneumonia.    If you have a stent:     Carry your stent card with you at all times.       Let all healthcare providers know that you have a stent.

## 2025-07-07 NOTE — ED STATDOCS - CLINICAL SUMMARY MEDICAL DECISION MAKING FREE TEXT BOX
46 y/o female, exam consistent with lice with contacts at home, daughter, with similar symptoms. Recommended over the counter therapy such as RID or Nix, will also eval chest discomfort with EKG, labs and CXR. Low suspicion for ACS, pt without risk factors.

## 2025-09-06 ENCOUNTER — OUTPATIENT (OUTPATIENT)
Dept: OUTPATIENT SERVICES | Facility: HOSPITAL | Age: 45
LOS: 1 days | End: 2025-09-06
Payer: COMMERCIAL

## 2025-09-06 ENCOUNTER — APPOINTMENT (OUTPATIENT)
Dept: MAMMOGRAPHY | Facility: CLINIC | Age: 45
End: 2025-09-06

## 2025-09-06 DIAGNOSIS — Z90.49 ACQUIRED ABSENCE OF OTHER SPECIFIED PARTS OF DIGESTIVE TRACT: Chronic | ICD-10-CM

## 2025-09-06 DIAGNOSIS — Z00.8 ENCOUNTER FOR OTHER GENERAL EXAMINATION: ICD-10-CM

## 2025-09-06 PROCEDURE — 77067 SCR MAMMO BI INCL CAD: CPT | Mod: 26

## 2025-09-06 PROCEDURE — 77063 BREAST TOMOSYNTHESIS BI: CPT | Mod: 26

## 2025-09-06 PROCEDURE — 77063 BREAST TOMOSYNTHESIS BI: CPT

## 2025-09-06 PROCEDURE — 77067 SCR MAMMO BI INCL CAD: CPT

## 2025-09-10 ENCOUNTER — APPOINTMENT (OUTPATIENT)
Dept: ORTHOPEDIC SURGERY | Facility: HOSPITAL | Age: 45
End: 2025-09-10

## 2025-09-10 ENCOUNTER — RESULT REVIEW (OUTPATIENT)
Age: 45
End: 2025-09-10

## 2025-09-10 VITALS
HEIGHT: 59.65 IN | WEIGHT: 158 LBS | RESPIRATION RATE: 16 BRPM | BODY MASS INDEX: 31.02 KG/M2 | HEART RATE: 71 BPM | OXYGEN SATURATION: 100 % | TEMPERATURE: 98 F | SYSTOLIC BLOOD PRESSURE: 117 MMHG | DIASTOLIC BLOOD PRESSURE: 75 MMHG

## 2025-09-10 DIAGNOSIS — M75.31 CALCIFIC TENDINITIS OF RIGHT SHOULDER: ICD-10-CM

## 2025-09-10 DIAGNOSIS — M75.32 CALCIFIC TENDINITIS OF RIGHT SHOULDER: ICD-10-CM

## 2025-09-10 DIAGNOSIS — M25.512 PAIN IN LEFT SHOULDER: ICD-10-CM

## 2025-09-11 PROBLEM — M25.512 ACUTE PAIN OF LEFT SHOULDER: Status: ACTIVE | Noted: 2025-09-11
